# Patient Record
Sex: MALE | Race: WHITE | NOT HISPANIC OR LATINO | Employment: UNEMPLOYED | URBAN - METROPOLITAN AREA
[De-identification: names, ages, dates, MRNs, and addresses within clinical notes are randomized per-mention and may not be internally consistent; named-entity substitution may affect disease eponyms.]

---

## 2023-07-27 LAB
ALANINE AMINOTRANSFERASE (SGPT) (U/L) IN SER/PLAS: 22 U/L (ref 10–52)
ALBUMIN (G/DL) IN SER/PLAS: 4.6 G/DL (ref 3.4–5)
ALKALINE PHOSPHATASE (U/L) IN SER/PLAS: 66 U/L (ref 33–120)
ANION GAP IN SER/PLAS: 15 MMOL/L (ref 10–20)
ASPARTATE AMINOTRANSFERASE (SGOT) (U/L) IN SER/PLAS: 15 U/L (ref 9–39)
BILIRUBIN TOTAL (MG/DL) IN SER/PLAS: 1.4 MG/DL (ref 0–1.2)
CALCIUM (MG/DL) IN SER/PLAS: 10.2 MG/DL (ref 8.6–10.6)
CARBON DIOXIDE, TOTAL (MMOL/L) IN SER/PLAS: 26 MMOL/L (ref 21–32)
CHLORIDE (MMOL/L) IN SER/PLAS: 101 MMOL/L (ref 98–107)
CHOLESTEROL (MG/DL) IN SER/PLAS: 110 MG/DL (ref 0–199)
CHOLESTEROL IN HDL (MG/DL) IN SER/PLAS: 38.8 MG/DL
CHOLESTEROL/HDL RATIO: 2.8
CREATININE (MG/DL) IN SER/PLAS: 0.87 MG/DL (ref 0.5–1.3)
ESTIMATED AVERAGE GLUCOSE FOR HBA1C: 143 MG/DL
GFR MALE: >90 ML/MIN/1.73M2
GLUCOSE (MG/DL) IN SER/PLAS: 101 MG/DL (ref 74–99)
HEMOGLOBIN A1C/HEMOGLOBIN TOTAL IN BLOOD: 6.6 %
LDL: 35 MG/DL (ref 0–99)
POTASSIUM (MMOL/L) IN SER/PLAS: 4.3 MMOL/L (ref 3.5–5.3)
PROSTATE SPECIFIC ANTIGEN,SCREEN: 0.63 NG/ML (ref 0–4)
PROTEIN TOTAL: 7.6 G/DL (ref 6.4–8.2)
SODIUM (MMOL/L) IN SER/PLAS: 138 MMOL/L (ref 136–145)
THYROTROPIN (MIU/L) IN SER/PLAS BY DETECTION LIMIT <= 0.05 MIU/L: 2.32 MIU/L (ref 0.44–3.98)
TRIGLYCERIDE (MG/DL) IN SER/PLAS: 183 MG/DL (ref 0–149)
UREA NITROGEN (MG/DL) IN SER/PLAS: 11 MG/DL (ref 6–23)
VLDL: 37 MG/DL (ref 0–40)

## 2023-10-03 DIAGNOSIS — I10 PRIMARY HYPERTENSION: Primary | ICD-10-CM

## 2023-10-03 RX ORDER — METOPROLOL SUCCINATE 100 MG/1
100 TABLET, EXTENDED RELEASE ORAL DAILY
Qty: 90 TABLET | Refills: 1 | Status: SHIPPED | OUTPATIENT
Start: 2023-10-03 | End: 2024-04-08

## 2023-12-27 DIAGNOSIS — R05.9 COUGH, UNSPECIFIED: ICD-10-CM

## 2023-12-27 DIAGNOSIS — E11.9 TYPE 2 DIABETES MELLITUS WITHOUT COMPLICATION, WITHOUT LONG-TERM CURRENT USE OF INSULIN (MULTI): Primary | ICD-10-CM

## 2023-12-28 RX ORDER — ALBUTEROL SULFATE 90 UG/1
AEROSOL, METERED RESPIRATORY (INHALATION)
Qty: 18 G | Refills: 6 | Status: SHIPPED | OUTPATIENT
Start: 2023-12-28

## 2023-12-28 RX ORDER — DULAGLUTIDE 1.5 MG/.5ML
INJECTION, SOLUTION SUBCUTANEOUS
Qty: 1 EACH | Refills: 1 | Status: SHIPPED | OUTPATIENT
Start: 2023-12-28 | End: 2024-02-05 | Stop reason: SDUPTHER

## 2024-02-05 DIAGNOSIS — E11.9 TYPE 2 DIABETES MELLITUS WITHOUT COMPLICATION, WITHOUT LONG-TERM CURRENT USE OF INSULIN (MULTI): ICD-10-CM

## 2024-02-05 RX ORDER — DULAGLUTIDE 1.5 MG/.5ML
1.5 INJECTION, SOLUTION SUBCUTANEOUS
Qty: 1 ML | Refills: 1 | Status: SHIPPED | OUTPATIENT
Start: 2024-02-05 | End: 2024-04-23 | Stop reason: SDUPTHER

## 2024-02-16 DIAGNOSIS — E11.9 DIABETES MELLITUS, STABLE (MULTI): ICD-10-CM

## 2024-02-17 RX ORDER — GLIMEPIRIDE 4 MG/1
4 TABLET ORAL 2 TIMES DAILY
Qty: 60 TABLET | Refills: 0 | Status: SHIPPED | OUTPATIENT
Start: 2024-02-17 | End: 2024-03-15

## 2024-02-28 ENCOUNTER — OFFICE VISIT (OUTPATIENT)
Dept: PRIMARY CARE | Facility: CLINIC | Age: 54
End: 2024-02-28
Payer: COMMERCIAL

## 2024-02-28 ENCOUNTER — LAB (OUTPATIENT)
Dept: LAB | Facility: LAB | Age: 54
End: 2024-02-28
Payer: COMMERCIAL

## 2024-02-28 VITALS
HEIGHT: 70 IN | OXYGEN SATURATION: 97 % | WEIGHT: 306.44 LBS | TEMPERATURE: 98.6 F | BODY MASS INDEX: 43.87 KG/M2 | DIASTOLIC BLOOD PRESSURE: 78 MMHG | RESPIRATION RATE: 18 BRPM | HEART RATE: 91 BPM | SYSTOLIC BLOOD PRESSURE: 140 MMHG

## 2024-02-28 DIAGNOSIS — E78.2 MIXED HYPERLIPIDEMIA: ICD-10-CM

## 2024-02-28 DIAGNOSIS — R91.1 LUNG NODULE: ICD-10-CM

## 2024-02-28 DIAGNOSIS — E11.9 TYPE 2 DIABETES MELLITUS WITHOUT COMPLICATION, WITHOUT LONG-TERM CURRENT USE OF INSULIN (MULTI): ICD-10-CM

## 2024-02-28 DIAGNOSIS — M10.9 ACUTE GOUT OF LEFT FOOT, UNSPECIFIED CAUSE: ICD-10-CM

## 2024-02-28 DIAGNOSIS — B35.1 TOENAIL FUNGUS: ICD-10-CM

## 2024-02-28 DIAGNOSIS — Z11.59 NEED FOR HEPATITIS C SCREENING TEST: ICD-10-CM

## 2024-02-28 DIAGNOSIS — I10 BENIGN ESSENTIAL HYPERTENSION: Primary | ICD-10-CM

## 2024-02-28 DIAGNOSIS — M76.60 ACHILLES TENDON PAIN: ICD-10-CM

## 2024-02-28 PROBLEM — I82.409 DVT (DEEP VENOUS THROMBOSIS) (MULTI): Status: RESOLVED | Noted: 2024-02-28 | Resolved: 2024-02-28

## 2024-02-28 PROBLEM — R43.0 LOSS OF SMELL: Status: RESOLVED | Noted: 2024-02-28 | Resolved: 2024-02-28

## 2024-02-28 PROBLEM — L97.511 DIABETIC ULCER OF TOE OF RIGHT FOOT ASSOCIATED WITH TYPE 2 DIABETES MELLITUS, LIMITED TO BREAKDOWN OF SKIN (MULTI): Status: RESOLVED | Noted: 2021-08-15 | Resolved: 2024-02-28

## 2024-02-28 PROBLEM — R19.00 MASS OF ABDOMEN: Status: RESOLVED | Noted: 2024-02-28 | Resolved: 2024-02-28

## 2024-02-28 PROBLEM — J02.9 SORE THROAT: Status: RESOLVED | Noted: 2024-02-28 | Resolved: 2024-02-28

## 2024-02-28 PROBLEM — G89.18 ACUTE POST-OPERATIVE PAIN: Status: RESOLVED | Noted: 2024-02-28 | Resolved: 2024-02-28

## 2024-02-28 PROBLEM — R05.9 COUGH: Status: RESOLVED | Noted: 2024-02-28 | Resolved: 2024-02-28

## 2024-02-28 PROBLEM — D17.30 LIPOMA OF SKIN AND SUBCUTANEOUS TISSUE: Status: RESOLVED | Noted: 2024-02-28 | Resolved: 2024-02-28

## 2024-02-28 PROBLEM — R09.89 THROAT FULLNESS: Status: RESOLVED | Noted: 2024-02-28 | Resolved: 2024-02-28

## 2024-02-28 PROBLEM — R59.9 ENLARGED LYMPH NODES: Status: RESOLVED | Noted: 2024-02-28 | Resolved: 2024-02-28

## 2024-02-28 PROBLEM — D49.2 NEOPLASM OF SOFT TISSUE: Status: ACTIVE | Noted: 2024-02-28

## 2024-02-28 PROBLEM — M25.672 STIFFNESS OF LEFT ANKLE JOINT: Status: RESOLVED | Noted: 2024-02-28 | Resolved: 2024-02-28

## 2024-02-28 PROBLEM — R09.81 NASAL CONGESTION: Status: RESOLVED | Noted: 2024-02-28 | Resolved: 2024-02-28

## 2024-02-28 PROBLEM — F17.210 NICOTINE DEPENDENCE, CIGARETTES, UNCOMPLICATED: Status: RESOLVED | Noted: 2024-02-28 | Resolved: 2024-02-28

## 2024-02-28 PROBLEM — R93.1 AGATSTON CAC SCORE, <100: Status: RESOLVED | Noted: 2024-02-28 | Resolved: 2024-02-28

## 2024-02-28 PROBLEM — E11.621 DIABETIC ULCER OF TOE OF RIGHT FOOT ASSOCIATED WITH TYPE 2 DIABETES MELLITUS, LIMITED TO BREAKDOWN OF SKIN (MULTI): Status: RESOLVED | Noted: 2021-08-15 | Resolved: 2024-02-28

## 2024-02-28 PROBLEM — G47.33 OBSTRUCTIVE SLEEP APNEA SYNDROME: Status: ACTIVE | Noted: 2021-04-22

## 2024-02-28 PROBLEM — L97.509 DIABETIC TOE ULCER (MULTI): Status: RESOLVED | Noted: 2021-08-15 | Resolved: 2024-02-28

## 2024-02-28 PROBLEM — E66.01 SEVERE OBESITY (BMI 35.0-39.9) WITH COMORBIDITY (MULTI): Status: ACTIVE | Noted: 2024-02-28

## 2024-02-28 PROBLEM — M54.50 ACUTE RIGHT-SIDED LOW BACK PAIN WITHOUT SCIATICA: Status: RESOLVED | Noted: 2024-02-28 | Resolved: 2024-02-28

## 2024-02-28 PROBLEM — S82.209A FRACTURE, TIBIA: Status: RESOLVED | Noted: 2024-02-28 | Resolved: 2024-02-28

## 2024-02-28 PROBLEM — J20.8 ACUTE BACTERIAL BRONCHITIS: Status: RESOLVED | Noted: 2024-02-28 | Resolved: 2024-02-28

## 2024-02-28 PROBLEM — R13.10 FOOD STICKS ON SWALLOWING: Status: RESOLVED | Noted: 2024-02-28 | Resolved: 2024-02-28

## 2024-02-28 PROBLEM — F41.9 ANXIETY: Status: ACTIVE | Noted: 2024-02-28

## 2024-02-28 PROBLEM — J37.0 CHRONIC LARYNGITIS: Status: RESOLVED | Noted: 2021-04-22 | Resolved: 2024-02-28

## 2024-02-28 PROBLEM — B96.89 ACUTE BACTERIAL BRONCHITIS: Status: RESOLVED | Noted: 2024-02-28 | Resolved: 2024-02-28

## 2024-02-28 PROBLEM — M25.572 LEFT ANKLE PAIN: Status: RESOLVED | Noted: 2024-02-28 | Resolved: 2024-02-28

## 2024-02-28 PROBLEM — R01.1 MURMUR: Status: ACTIVE | Noted: 2024-02-28

## 2024-02-28 PROBLEM — L73.9 FOLLICULITIS: Status: RESOLVED | Noted: 2024-02-28 | Resolved: 2024-02-28

## 2024-02-28 PROBLEM — L30.9 ECZEMA OF HAND: Status: RESOLVED | Noted: 2024-02-28 | Resolved: 2024-02-28

## 2024-02-28 PROBLEM — D37.09 NEOPLASM OF UNCERTAIN BEHAVIOR OF UVULA: Status: ACTIVE | Noted: 2021-04-22

## 2024-02-28 PROBLEM — K21.9 GASTROESOPHAGEAL REFLUX DISEASE WITHOUT ESOPHAGITIS: Status: ACTIVE | Noted: 2021-04-22

## 2024-02-28 PROBLEM — R43.2 LOSS OF TASTE: Status: RESOLVED | Noted: 2024-02-28 | Resolved: 2024-02-28

## 2024-02-28 PROBLEM — E11.621 DIABETIC TOE ULCER (MULTI): Status: RESOLVED | Noted: 2021-08-15 | Resolved: 2024-02-28

## 2024-02-28 PROBLEM — M25.562 LEFT KNEE PAIN: Status: RESOLVED | Noted: 2024-02-28 | Resolved: 2024-02-28

## 2024-02-28 PROBLEM — F17.200 NICOTINE USE DISORDER: Status: RESOLVED | Noted: 2021-08-17 | Resolved: 2024-02-28

## 2024-02-28 LAB
ALBUMIN SERPL BCP-MCNC: 4.7 G/DL (ref 3.4–5)
ALP SERPL-CCNC: 60 U/L (ref 33–120)
ALT SERPL W P-5'-P-CCNC: 25 U/L (ref 10–52)
ANION GAP SERPL CALC-SCNC: 19 MMOL/L (ref 10–20)
AST SERPL W P-5'-P-CCNC: 19 U/L (ref 9–39)
BILIRUB SERPL-MCNC: 1.3 MG/DL (ref 0–1.2)
BUN SERPL-MCNC: 11 MG/DL (ref 6–23)
CALCIUM SERPL-MCNC: 9.8 MG/DL (ref 8.6–10.6)
CHLORIDE SERPL-SCNC: 99 MMOL/L (ref 98–107)
CO2 SERPL-SCNC: 24 MMOL/L (ref 21–32)
CREAT SERPL-MCNC: 0.87 MG/DL (ref 0.5–1.3)
CREAT UR-MCNC: 235.2 MG/DL (ref 20–370)
EGFRCR SERPLBLD CKD-EPI 2021: >90 ML/MIN/1.73M*2
ERYTHROCYTE [DISTWIDTH] IN BLOOD BY AUTOMATED COUNT: 12.7 % (ref 11.5–14.5)
EST. AVERAGE GLUCOSE BLD GHB EST-MCNC: 189 MG/DL
GLUCOSE SERPL-MCNC: 142 MG/DL (ref 74–99)
HBA1C MFR BLD: 8.2 %
HCT VFR BLD AUTO: 41.9 % (ref 41–52)
HCV AB SER QL: NONREACTIVE
HGB BLD-MCNC: 14.3 G/DL (ref 13.5–17.5)
MCH RBC QN AUTO: 32.6 PG (ref 26–34)
MCHC RBC AUTO-ENTMCNC: 34.1 G/DL (ref 32–36)
MCV RBC AUTO: 95 FL (ref 80–100)
MICROALBUMIN UR-MCNC: 83.7 MG/L
MICROALBUMIN/CREAT UR: 35.6 UG/MG CREAT
NRBC BLD-RTO: 0 /100 WBCS (ref 0–0)
PLATELET # BLD AUTO: 238 X10*3/UL (ref 150–450)
POTASSIUM SERPL-SCNC: 4.1 MMOL/L (ref 3.5–5.3)
PROT SERPL-MCNC: 7.3 G/DL (ref 6.4–8.2)
RBC # BLD AUTO: 4.39 X10*6/UL (ref 4.5–5.9)
SODIUM SERPL-SCNC: 138 MMOL/L (ref 136–145)
URATE SERPL-MCNC: 8.4 MG/DL (ref 4–7.5)
WBC # BLD AUTO: 7.5 X10*3/UL (ref 4.4–11.3)

## 2024-02-28 PROCEDURE — 83036 HEMOGLOBIN GLYCOSYLATED A1C: CPT

## 2024-02-28 PROCEDURE — 84550 ASSAY OF BLOOD/URIC ACID: CPT

## 2024-02-28 PROCEDURE — 86803 HEPATITIS C AB TEST: CPT

## 2024-02-28 PROCEDURE — 3078F DIAST BP <80 MM HG: CPT | Performed by: PHYSICIAN ASSISTANT

## 2024-02-28 PROCEDURE — 82043 UR ALBUMIN QUANTITATIVE: CPT

## 2024-02-28 PROCEDURE — 80053 COMPREHEN METABOLIC PANEL: CPT

## 2024-02-28 PROCEDURE — 36415 COLL VENOUS BLD VENIPUNCTURE: CPT

## 2024-02-28 PROCEDURE — 85027 COMPLETE CBC AUTOMATED: CPT

## 2024-02-28 PROCEDURE — 82570 ASSAY OF URINE CREATININE: CPT

## 2024-02-28 PROCEDURE — 1036F TOBACCO NON-USER: CPT | Performed by: PHYSICIAN ASSISTANT

## 2024-02-28 PROCEDURE — 3077F SYST BP >= 140 MM HG: CPT | Performed by: PHYSICIAN ASSISTANT

## 2024-02-28 PROCEDURE — 99215 OFFICE O/P EST HI 40 MIN: CPT | Performed by: PHYSICIAN ASSISTANT

## 2024-02-28 RX ORDER — METFORMIN HYDROCHLORIDE 1000 MG/1
1 TABLET ORAL
COMMUNITY
Start: 2019-10-15

## 2024-02-28 RX ORDER — BLOOD SUGAR DIAGNOSTIC
STRIP MISCELLANEOUS
COMMUNITY

## 2024-02-28 RX ORDER — ATORVASTATIN CALCIUM 40 MG/1
40 TABLET, FILM COATED ORAL DAILY
Qty: 90 TABLET | Refills: 1 | Status: SHIPPED | OUTPATIENT
Start: 2024-02-28 | End: 2024-08-26

## 2024-02-28 RX ORDER — ATORVASTATIN CALCIUM 40 MG/1
1 TABLET, FILM COATED ORAL DAILY
COMMUNITY
Start: 2019-11-11 | End: 2024-02-28 | Stop reason: SDUPTHER

## 2024-02-28 ASSESSMENT — ENCOUNTER SYMPTOMS
ARTHRALGIAS: 0
MUSCULOSKELETAL NEGATIVE: 1
ABDOMINAL PAIN: 0
WHEEZING: 0
HEADACHES: 0
OCCASIONAL FEELINGS OF UNSTEADINESS: 0
VOMITING: 0
SHORTNESS OF BREATH: 0
HEMATOLOGIC/LYMPHATIC NEGATIVE: 1
PALPITATIONS: 0
DIZZINESS: 0
ALLERGIC/IMMUNOLOGIC NEGATIVE: 1
NAUSEA: 0
LOSS OF SENSATION IN FEET: 0
RESPIRATORY NEGATIVE: 1
NERVOUS/ANXIOUS: 0
ENDOCRINE NEGATIVE: 1
NEUROLOGICAL NEGATIVE: 1
PSYCHIATRIC NEGATIVE: 1
EYES NEGATIVE: 1
CONSTITUTIONAL NEGATIVE: 1
DEPRESSION: 0
COUGH: 0
BACK PAIN: 0

## 2024-02-28 ASSESSMENT — PATIENT HEALTH QUESTIONNAIRE - PHQ9
SUM OF ALL RESPONSES TO PHQ9 QUESTIONS 1 AND 2: 0
1. LITTLE INTEREST OR PLEASURE IN DOING THINGS: NOT AT ALL
2. FEELING DOWN, DEPRESSED OR HOPELESS: NOT AT ALL

## 2024-02-28 ASSESSMENT — PAIN SCALES - GENERAL: PAINLEVEL: 3

## 2024-02-28 NOTE — PROGRESS NOTES
"Subjective   Patient ID: Jack Nolen is a 54 y.o. male who presents for Med Refill.    Med Refill  Pertinent negatives include no abdominal pain, arthralgias, chest pain, coughing, headaches, nausea, rash or vomiting.      JACK NOLEN, 54 year old male is here today for follow up     immunizations declined shingrex - advised of risks- pt aware      controlled diabetes - due for A1c and eye exam   BS at home in am 300s - lunch 250s and pm ranges 200-230s        HTN- taking medications - controlled no Ses- no CP no SOB    Trace edema lower extremities        QUIT tobacco dependency 1PPD x 30 years,- 9 months ago    + ETOH 1-2 x week social      not taking BS at home - wt gain due to quit smoking 9 months ago     patient states feeling well otherwise  denies fever, chills, N/V, headache, dizziness, CP, SOB, palpitations,, weakness  A chaperone was offered to the patient for the physical exam /  exam and was declined       Pt declined all vaccines today - shingles and pneumonia  covid  flu     Review of Systems   Constitutional: Negative.    HENT: Negative.     Eyes: Negative.    Respiratory: Negative.  Negative for cough, shortness of breath and wheezing.    Cardiovascular:  Positive for leg swelling. Negative for chest pain and palpitations.   Gastrointestinal:  Negative for abdominal pain, nausea and vomiting.   Endocrine: Negative.    Genitourinary: Negative.    Musculoskeletal: Negative.  Negative for arthralgias and back pain.        Foot pain - achilles region right    Skin: Negative.  Negative for rash.   Allergic/Immunologic: Negative.    Neurological: Negative.  Negative for dizziness and headaches.   Hematological: Negative.    Psychiatric/Behavioral: Negative.  The patient is not nervous/anxious.        Objective   /78 (BP Location: Left arm, Patient Position: Sitting)   Pulse 91   Temp 37 °C (98.6 °F) (Temporal)   Resp 18   Ht 1.775 m (5' 9.88\")   Wt 139 kg (306 lb 7 oz)   " SpO2 97%   BMI 44.12 kg/m²     Physical Exam  Constitutional:       Appearance: Normal appearance. He is obese.   HENT:      Head: Normocephalic and atraumatic.   Cardiovascular:      Rate and Rhythm: Normal rate and regular rhythm.      Heart sounds: Normal heart sounds.   Pulmonary:      Effort: Pulmonary effort is normal.      Breath sounds: Normal breath sounds.   Abdominal:      General: Bowel sounds are normal.      Palpations: Abdomen is soft.   Musculoskeletal:         General: Normal range of motion.      Cervical back: Neck supple.   Skin:     General: Skin is warm and dry.   Neurological:      General: No focal deficit present.      Mental Status: He is alert and oriented to person, place, and time.      Comments: Feet:  socks removed, no foot pain reported, no deformities, ulcers,   + calluses and   + pain in right achilles   sensitive to 10 GM monofilament=- only at some poins  + numbness at others      Psychiatric:         Mood and Affect: Mood normal.         Behavior: Behavior normal.         Thought Content: Thought content normal.         Judgment: Judgment normal.         Assessment/Plan   Problem List Items Addressed This Visit       Acute gout of left foot    Relevant Orders    Uric Acid    Benign essential hypertension - Primary    Type 2 diabetes mellitus without complication (CMS/HCC)    Relevant Orders    Albumin , Urine Random    Hemoglobin A1C    Referral to Ophthalmology    Mixed hyperlipidemia    Relevant Medications    atorvastatin (Lipitor) 40 mg tablet    Other Relevant Orders    Comprehensive Metabolic Panel     Other Visit Diagnoses       Need for hepatitis C screening test        Relevant Orders    Hepatitis C Antibody             Diagnoses/Problems       Type 2 diabetes mellitus, uncontrolled (250.02) (E11.65)    DM w/o complication type II (250.00) (E11.9)  -Stable, poor control, not taking BS at home - wt gain due to quit smoking 9 months ago   -Continue current medications,  side effects, risks and options discussed with patient   -Blood glucose monitoring daily advised  -Ophthalmology for evaluation and management of diabetic eye changes - DUE   -Encouraged regular aerobic exercise weight loss and diabetic diet ADA  -Discussed diabetic education issues of long-term diabetic complications, hyperglycemic symptoms, diet, and dictations-side effects and need for compliance, importance of exercise  -Follow up in 3-4 months  should any issues arise before then f/u sooner   monitor BS call if lower than 70 or above 180   Patient verbalized understanding and agrees to plan of care.          Benign essential hypertension (401.1) (I10)  -stable  -Fair control  -Continue current medications, side effects, risks and options discussed, patient aware   -Encouraged dietary sodium restriction/ DASH diet  -Recommend regular aerobic exercise  -Discussed need and benefit for weight loss  -Recheck in 3- 4 months or sooner should any symptoms or problems arise  - Goal of blood pressure less than 130/80          Mixed hyperlipidemia (272.2) (E78.2)  -Continue current medication as directed  -Encouraged following a low-fat low-cholesterol diet   -Discussed the benefits of regular aerobic exercise and weight loss  -Encouraged following a low-carb healthy oil intake diet  - last lipid 7/2023          Morbid obesity with BMI of 40.0-44.9, adult (278.01,V85.41) (E66.01,Z68.41)  -Discussed with the patient significance of elevated BMI and obesity. Emphasized cardiovascular risk   pt to follow ADA diet and exercise plan      tobacco depend  -CT low does chest - due   - quit 9 months ago   -Cessation encouraged, You need to stop smoking. Though it is not easy, more than half of all adult smokers  have quit. We encourage you to write down all the reasons you should quit smoking and  set a quit date for yourself. Ask us how we can help. You may also call  6-938-QUIT-NOW for free resources and  assistance  --physiological and physical aspects of tobacco addiction as well as strategies for quitting were discussed  -Counseling was given focusing on the harmful effects of this addiction especially given the patient's medical conditions which will be worsened because of that, goals and tobacco        High complexity visit of 45 minutes face-to-face with at least half of the time discussing  Results of my examination, clinical findings, impression and diagnoses discussed with the patient as well as results of the latest blood work. The patient was in agreement with the plan and verbalized a good understanding of instructions and plans for treatment. At the end of the visit today, the patient felt that all questions had been answered satisfactorily. The patient was pleased with the visit and very appreciative for the care rendered.        Pt declined all vaccines today - shingles and pneumonia  covid  flu

## 2024-03-04 ENCOUNTER — TELEPHONE (OUTPATIENT)
Dept: PRIMARY CARE | Facility: CLINIC | Age: 54
End: 2024-03-04

## 2024-03-04 ENCOUNTER — TELEPHONE (OUTPATIENT)
Dept: PRIMARY CARE | Facility: CLINIC | Age: 54
End: 2024-03-04
Payer: COMMERCIAL

## 2024-03-04 DIAGNOSIS — E11.9 TYPE 2 DIABETES MELLITUS WITHOUT COMPLICATION, WITHOUT LONG-TERM CURRENT USE OF INSULIN (MULTI): ICD-10-CM

## 2024-03-05 NOTE — RESULT ENCOUNTER NOTE
Spoke  to the pt - increased wt and eating poorly due to just quit smoking - pt also unable to get Trulicity due to back order will try ozempicc 0.25 weekly for 4-8 weeks and follow up  in office if any side effects  Also elevated uric acid - pt states no longer having the foot and heel pain   All other  labs are stable -continue medications as directed and also continue low fat sugar diet exercise multivitamin and vitamin D daily   Make sure you are watching and/or cutting back on carbs such as bread, pasta, rice, potato, butters and fats, fried foods, red meats while working on a low carb/low fat diet. Cut back on sweets and sugar containing products. NO FAST FOOD. Water should be the main beverage. Cut back on alcohol and/or tobacco if you are using. Review the Mediterranean diet online. Also incorporate 30-50 consecutive minutes of physical activity/exercise daily. We will continue to monitor your cholesterol.  Call office with any further questions or concerns     The patient was in agreement with the plan and verbalized a good understanding of instructions and plans for treatment.

## 2024-03-07 ENCOUNTER — OFFICE VISIT (OUTPATIENT)
Dept: PRIMARY CARE | Facility: CLINIC | Age: 54
End: 2024-03-07
Payer: COMMERCIAL

## 2024-03-07 DIAGNOSIS — I10 BENIGN ESSENTIAL HYPERTENSION: ICD-10-CM

## 2024-03-07 DIAGNOSIS — E78.2 MIXED HYPERLIPIDEMIA: ICD-10-CM

## 2024-03-07 DIAGNOSIS — E66.01 SEVERE OBESITY (BMI 35.0-39.9) WITH COMORBIDITY (MULTI): ICD-10-CM

## 2024-03-07 DIAGNOSIS — Z87.891 HISTORY OF TOBACCO USE: ICD-10-CM

## 2024-03-07 DIAGNOSIS — E11.9 TYPE 2 DIABETES MELLITUS WITHOUT COMPLICATION, WITHOUT LONG-TERM CURRENT USE OF INSULIN (MULTI): Primary | ICD-10-CM

## 2024-03-07 PROCEDURE — 1036F TOBACCO NON-USER: CPT | Performed by: PHYSICIAN ASSISTANT

## 2024-03-07 PROCEDURE — 99214 OFFICE O/P EST MOD 30 MIN: CPT | Performed by: PHYSICIAN ASSISTANT

## 2024-03-07 PROCEDURE — 3052F HG A1C>EQUAL 8.0%<EQUAL 9.0%: CPT | Performed by: PHYSICIAN ASSISTANT

## 2024-03-07 PROCEDURE — 3060F POS MICROALBUMINURIA REV: CPT | Performed by: PHYSICIAN ASSISTANT

## 2024-03-07 ASSESSMENT — ENCOUNTER SYMPTOMS
MUSCULOSKELETAL NEGATIVE: 1
HEADACHES: 0
NAUSEA: 0
COUGH: 0
SHORTNESS OF BREATH: 0
CONSTITUTIONAL NEGATIVE: 1
EYES NEGATIVE: 1
ARTHRALGIAS: 0
BACK PAIN: 0
WHEEZING: 0
PSYCHIATRIC NEGATIVE: 1
VOMITING: 0
NEUROLOGICAL NEGATIVE: 1
RESPIRATORY NEGATIVE: 1
HEMATOLOGIC/LYMPHATIC NEGATIVE: 1
NERVOUS/ANXIOUS: 0
ENDOCRINE NEGATIVE: 1
PALPITATIONS: 0
DIZZINESS: 0
ALLERGIC/IMMUNOLOGIC NEGATIVE: 1
ABDOMINAL PAIN: 0

## 2024-03-07 NOTE — PROGRESS NOTES
Subjective   Patient ID: Jack Nolen is a 54 y.o. male who presents for No chief complaint on file..    HPI   JACK NOLEN, 54 year old male is here today for follow up      immunizations declined shingrex - advised of risks- pt aware      controlled diabetes - due for A1c and eye exam   BS at home in am 300s - lunch 250s and pm ranges 200-230s         HTN- taking medications - controlled no Ses- no CP no SOB    Trace edema lower extremities         QUIT tobacco dependency 1PPD x 30 years,- 9 months ago    + ETOH 1-2 x week social      not taking BS at home - wt gain due to quit smoking 9 months ago      Review of Systems   Constitutional: Negative.    HENT: Negative.     Eyes: Negative.    Respiratory: Negative.  Negative for cough, shortness of breath and wheezing.    Cardiovascular:  Negative for chest pain and palpitations.   Gastrointestinal:  Negative for abdominal pain, nausea and vomiting.   Endocrine: Negative.    Genitourinary: Negative.    Musculoskeletal: Negative.  Negative for arthralgias and back pain.   Skin: Negative.  Negative for rash.   Allergic/Immunologic: Negative.    Neurological: Negative.  Negative for dizziness and headaches.   Hematological: Negative.    Psychiatric/Behavioral: Negative.  The patient is not nervous/anxious.        Objective   There were no vitals taken for this visit.    Physical Exam  Vitals and nursing note reviewed.   Constitutional:       Appearance: Normal appearance. He is obese.   HENT:      Head: Atraumatic.   Cardiovascular:      Rate and Rhythm: Normal rate and regular rhythm.      Heart sounds: Normal heart sounds.   Pulmonary:      Effort: Pulmonary effort is normal.      Breath sounds: Normal breath sounds.   Musculoskeletal:      Cervical back: Neck supple.   Skin:     General: Skin is warm and dry.   Neurological:      Mental Status: He is alert and oriented to person, place, and time.   Psychiatric:         Mood and Affect: Mood normal.          Behavior: Behavior normal.         Thought Content: Thought content normal.         Judgment: Judgment normal.         Assessment/Plan   Problem List Items Addressed This Visit       Benign essential hypertension    Type 2 diabetes mellitus without complication (CMS/HCC) - Primary    Severe obesity (BMI 35.0-39.9) with comorbidity (CMS/HCC)    Mixed hyperlipidemia     Other Visit Diagnoses       History of tobacco use               Type 2 diabetes mellitus, uncontrolled (250.02) (E11.65)    DM w/o complication type II (250.00) (E11.9)  -Stable, poor control, not taking BS at home - wt gain due to quit smoking 9 months ago - last A1c 8.2- up from 6.6 - also can not get trulicy - due to backorder  -will switch to ozempic 0.25mg once weekly x 4 weeks and follow up in 4 weeks may increase to 0.50 once weekly if tolerating well and no Ses   -Continue current medications, side effects, risks and options discussed with patient - sample given today and instructed on use as well   -Blood glucose monitoring daily advised  -Ophthalmology for evaluation and management of diabetic eye changes - DUE   -Encouraged regular aerobic exercise weight loss and diabetic diet ADA  -Discussed diabetic education issues of long-term diabetic complications, hyperglycemic symptoms, diet, and dictations-side effects and need for compliance, importance of exercise  -Follow up in 3-4 months  should any issues arise before then f/u sooner   monitor BS call if lower than 70 or above 180   Patient verbalized understanding and agrees to plan of care.          Benign essential hypertension (401.1) (I10)  -stable  -Fair control  -Continue current medications, side effects, risks and options discussed, patient aware   -Encouraged dietary sodium restriction/ DASH diet  -Recommend regular aerobic exercise  -Discussed need and benefit for weight loss  -Recheck in 3- 4 months or sooner should any symptoms or problems arise  - Goal of blood pressure  less than 130/80          Mixed hyperlipidemia (272.2) (E78.2)  -Continue current medication as directed  -Encouraged following a low-fat low-cholesterol diet   -Discussed the benefits of regular aerobic exercise and weight loss  -Encouraged following a low-carb healthy oil intake diet  - last lipid 7/2023           Morbid obesity with BMI of 40.0-44.9, adult (278.01,V85.41) (E66.01,Z68.41)  -Discussed with the patient significance of elevated BMI and obesity. Emphasized cardiovascular risk   pt to follow ADA diet and exercise plan      tobacco depend  -CT low does chest - due   - quit 9 months ago   -Cessation encouraged, You need to stop smoking. Though it is not easy, more than half of all adult smokers  have quit. We encourage you to write down all the reasons you should quit smoking and  set a quit date for yourself. Ask us how we can help. You may also call  9-800-QUIT-NOW for free resources and assistance  --physiological and physical aspects of tobacco addiction as well as strategies for quitting were discussed  -Counseling was given focusing on the harmful effects of this addiction especially given the patient's medical conditions which will be worsened because of that, goals and tobacco        High complexity visit of 30 minutes face-to-face with at least half of the time discussing  Results of my examination, clinical findings, impression and diagnoses discussed with the patient as well as results of the latest blood work. The patient was in agreement with the plan and verbalized a good understanding of instructions and plans for treatment. At the end of the visit today, the patient felt that all questions had been answered satisfactorily. The patient was pleased with the visit and very appreciative for the care rendered.

## 2024-03-15 DIAGNOSIS — E11.9 DIABETES MELLITUS, STABLE (MULTI): ICD-10-CM

## 2024-03-15 RX ORDER — GLIMEPIRIDE 4 MG/1
4 TABLET ORAL 2 TIMES DAILY
Qty: 180 TABLET | Refills: 1 | Status: SHIPPED | OUTPATIENT
Start: 2024-03-15

## 2024-04-08 DIAGNOSIS — I10 PRIMARY HYPERTENSION: ICD-10-CM

## 2024-04-08 RX ORDER — METOPROLOL SUCCINATE 100 MG/1
100 TABLET, EXTENDED RELEASE ORAL DAILY
Qty: 90 TABLET | Refills: 1 | Status: SHIPPED | OUTPATIENT
Start: 2024-04-08

## 2024-04-23 ENCOUNTER — OFFICE VISIT (OUTPATIENT)
Dept: PRIMARY CARE | Facility: CLINIC | Age: 54
End: 2024-04-23
Payer: COMMERCIAL

## 2024-04-23 VITALS
SYSTOLIC BLOOD PRESSURE: 144 MMHG | DIASTOLIC BLOOD PRESSURE: 74 MMHG | BODY MASS INDEX: 45.03 KG/M2 | HEIGHT: 69 IN | WEIGHT: 304 LBS | HEART RATE: 93 BPM | OXYGEN SATURATION: 95 %

## 2024-04-23 DIAGNOSIS — L02.224 BOIL, GROIN: ICD-10-CM

## 2024-04-23 DIAGNOSIS — E66.01 MORBID OBESITY (MULTI): ICD-10-CM

## 2024-04-23 DIAGNOSIS — E11.9 TYPE 2 DIABETES MELLITUS WITHOUT COMPLICATION, WITHOUT LONG-TERM CURRENT USE OF INSULIN (MULTI): ICD-10-CM

## 2024-04-23 DIAGNOSIS — G47.33 OBSTRUCTIVE SLEEP APNEA SYNDROME: ICD-10-CM

## 2024-04-23 DIAGNOSIS — I10 BENIGN ESSENTIAL HYPERTENSION: Primary | ICD-10-CM

## 2024-04-23 PROCEDURE — 3060F POS MICROALBUMINURIA REV: CPT | Performed by: PHYSICIAN ASSISTANT

## 2024-04-23 PROCEDURE — 3052F HG A1C>EQUAL 8.0%<EQUAL 9.0%: CPT | Performed by: PHYSICIAN ASSISTANT

## 2024-04-23 PROCEDURE — 3077F SYST BP >= 140 MM HG: CPT | Performed by: PHYSICIAN ASSISTANT

## 2024-04-23 PROCEDURE — 99214 OFFICE O/P EST MOD 30 MIN: CPT | Performed by: PHYSICIAN ASSISTANT

## 2024-04-23 PROCEDURE — 1036F TOBACCO NON-USER: CPT | Performed by: PHYSICIAN ASSISTANT

## 2024-04-23 PROCEDURE — 3078F DIAST BP <80 MM HG: CPT | Performed by: PHYSICIAN ASSISTANT

## 2024-04-23 PROCEDURE — 3008F BODY MASS INDEX DOCD: CPT | Performed by: PHYSICIAN ASSISTANT

## 2024-04-23 RX ORDER — DULAGLUTIDE 1.5 MG/.5ML
3 INJECTION, SOLUTION SUBCUTANEOUS
Qty: 1 ML | Refills: 1 | Status: SHIPPED | OUTPATIENT
Start: 2024-04-28 | End: 2024-04-23

## 2024-04-23 RX ORDER — CEPHALEXIN 500 MG/1
500 CAPSULE ORAL 2 TIMES DAILY
Qty: 14 CAPSULE | Refills: 0 | Status: SHIPPED | OUTPATIENT
Start: 2024-04-23 | End: 2024-04-30

## 2024-04-23 RX ORDER — DULAGLUTIDE 1.5 MG/.5ML
3 INJECTION, SOLUTION SUBCUTANEOUS
Qty: 4 ML | Refills: 3 | Status: SHIPPED | OUTPATIENT
Start: 2024-04-23

## 2024-04-23 ASSESSMENT — ENCOUNTER SYMPTOMS
LOSS OF SENSATION IN FEET: 0
DEPRESSION: 0
OCCASIONAL FEELINGS OF UNSTEADINESS: 0

## 2024-04-23 ASSESSMENT — PATIENT HEALTH QUESTIONNAIRE - PHQ9
1. LITTLE INTEREST OR PLEASURE IN DOING THINGS: NOT AT ALL
2. FEELING DOWN, DEPRESSED OR HOPELESS: NOT AT ALL
SUM OF ALL RESPONSES TO PHQ9 QUESTIONS 1 AND 2: 0

## 2024-04-23 NOTE — PROGRESS NOTES
"Subjective   Patient ID: Jack Nolen is a 54 y.o. male who presents for Annual Exam (Diabetes -  possible boil on the inside of R thigh).    HPI   JACK NOLEN, 54 year old male is here today for follow up      immunizations declined shingrex - advised of risks- pt aware      uncontrolled diabetes - last A1c 8.2 and eye exam DUE -   BS at home in am 300s - lunch 250s and pm ranges 200-230s   Went to ozempic - trulicity not avaiable - doing well no Ses - but now not covered by insurance        HTN- taking medications - controlled no Ses- no CP no SOB    Trace edema lower extremities         QUIT tobacco dependency 1PPD x 30 years,- 9 months ago    + ETOH 1-2 x week social      not taking BS at home - wt gain due to quit smoking 9 months ago      Boil - right inner thigh - near groin - _+ draining - pt noticed 2 weeks ago - doing better- smaller       Review of Systems   Constitutional: Negative.    HENT: Negative.     Eyes: Negative.    Respiratory: Negative.  Negative for cough, shortness of breath and wheezing.    Cardiovascular: Negative.  Negative for chest pain and palpitations.   Gastrointestinal: Negative.  Negative for abdominal pain, nausea and vomiting.   Endocrine: Negative.    Genitourinary: Negative.    Musculoskeletal: Negative.  Negative for arthralgias and back pain.   Skin: Negative.  Negative for rash.        + boil - no warmth or redness- right inner thigh    Allergic/Immunologic: Negative.    Neurological: Negative.  Negative for dizziness and headaches.   Hematological: Negative.    Psychiatric/Behavioral: Negative.  The patient is not nervous/anxious.        Objective   /74 (BP Location: Right arm, Patient Position: Sitting)   Pulse 93   Ht 1.753 m (5' 9\")   Wt 138 kg (304 lb)   SpO2 95%   BMI 44.89 kg/m²     Physical Exam  Vitals and nursing note reviewed.   Constitutional:       Appearance: Normal appearance. He is obese.   HENT:      Head: Normocephalic and " atraumatic.   Cardiovascular:      Rate and Rhythm: Normal rate and regular rhythm.      Heart sounds: Normal heart sounds.   Pulmonary:      Effort: Pulmonary effort is normal.      Breath sounds: Normal breath sounds.   Abdominal:      General: Bowel sounds are normal.      Palpations: Abdomen is soft.   Musculoskeletal:         General: Normal range of motion.      Cervical back: Neck supple.   Skin:     General: Skin is warm.      Comments: + boil in groin inner thigh   Neurological:      General: No focal deficit present.      Mental Status: He is alert and oriented to person, place, and time.   Psychiatric:         Mood and Affect: Mood normal.         Behavior: Behavior normal.         Thought Content: Thought content normal.         Judgment: Judgment normal.       Assessment/Plan   Problem List Items Addressed This Visit       Benign essential hypertension - Primary    Type 2 diabetes mellitus without complication (Multi)    Relevant Medications    dulaglutide (Trulicity) 1.5 mg/0.5 mL pen injector injection (Start on 4/28/2024)    Obstructive sleep apnea syndrome     Other Visit Diagnoses       BMI 40.0-44.9, adult (Multi)        Morbid obesity (Multi)        Boil, groin        Relevant Medications    cephalexin (Keflex) 500 mg capsule            Boil in groin may need I and D  RX keflex - no reaction or SE when taken the last 2 years in hospital for foot infection   -Discussed medication dosage, usage, goals of therapy, and side effects      Type 2 diabetes mellitus, uncontrolled (250.02) (E11.65)   • DM w/o complication type II (250.00) (E11.9)  -Stable, poor control, not taking BS at home - wt gain due to quit smoking 9 months ago - last A1c 8.2- up from 6.6 - also can not get trulicy - due to backorder  -will switch to ozempic 0.25mg once weekly x 4 weeks and follow up in 4 weeks may increase to 0.50 once weekly if tolerating well and no Ses   -Continue current medications, side effects, risks and  options discussed with patient - sample given today and instructed on use as well   -Blood glucose monitoring daily advised  -Ophthalmology for evaluation and management of diabetic eye changes - DUE   -Encouraged regular aerobic exercise weight loss and diabetic diet ADA  -Discussed diabetic education issues of long-term diabetic complications, hyperglycemic symptoms, diet, and dictations-side effects and need for compliance, importance of exercise  -Follow up in 3-4 months  should any issues arise before then f/u sooner   monitor BS call if lower than 70 or above 180   Patient verbalized understanding and agrees to plan of care.         • Benign essential hypertension (401.1) (I10)  -stable  -Fair control  -Continue current medications, side effects, risks and options discussed, patient aware   -Encouraged dietary sodium restriction/ DASH diet  -Recommend regular aerobic exercise  -Discussed need and benefit for weight loss  -Recheck in 3- 4 months or sooner should any symptoms or problems arise  - Goal of blood pressure less than 130/80         • Mixed hyperlipidemia (272.2) (E78.2)  -Continue current medication as directed  -Encouraged following a low-fat low-cholesterol diet   -Discussed the benefits of regular aerobic exercise and weight loss  -Encouraged following a low-carb healthy oil intake diet  - last lipid 7/2023          • Morbid obesity with BMI of 40.0-44.9, adult (278.01,V85.41) (E66.01,Z68.41)  -Discussed with the patient significance of elevated BMI and obesity. Emphasized cardiovascular risk   pt to follow ADA diet and exercise plan      tobacco depend  -CT low does chest - due   - quit 11 months ago   -Cessation encouraged, You need to stop smoking. Though it is not easy, more than half of all adult smokers  have quit. We encourage you to write down all the reasons you should quit smoking and  set a quit date for yourself. Ask us how we can help. You may also call  7-537-QUIT-NOW for free  resources and assistance  --physiological and physical aspects of tobacco addiction as well as strategies for quitting were discussed  -Counseling was given focusing on the harmful effects of this addiction especially given the patient's medical conditions which will be worsened because of that, goals and tobacco             complexity visit of 30+  minutes face-to-face with at least half of the time discussing  Results of my examination, clinical findings, impression and diagnoses discussed with the patient as well as results of the latest test/lab work. The patient was in agreement with the plan and verbalized a good understanding of instructions and plans for treatment. At the end of the visit today, the patient felt that all questions had been answered satisfactorily. The patient was pleased with the visit and very appreciative for the care rendered.

## 2024-04-24 DIAGNOSIS — I10 ESSENTIAL (PRIMARY) HYPERTENSION: ICD-10-CM

## 2024-04-24 ASSESSMENT — ENCOUNTER SYMPTOMS
NAUSEA: 0
ARTHRALGIAS: 0
SHORTNESS OF BREATH: 0
WHEEZING: 0
PSYCHIATRIC NEGATIVE: 1
BACK PAIN: 0
CONSTITUTIONAL NEGATIVE: 1
HEMATOLOGIC/LYMPHATIC NEGATIVE: 1
VOMITING: 0
ALLERGIC/IMMUNOLOGIC NEGATIVE: 1
CARDIOVASCULAR NEGATIVE: 1
ABDOMINAL PAIN: 0
RESPIRATORY NEGATIVE: 1
GASTROINTESTINAL NEGATIVE: 1
NERVOUS/ANXIOUS: 0
PALPITATIONS: 0
EYES NEGATIVE: 1
HEADACHES: 0
ENDOCRINE NEGATIVE: 1
DIZZINESS: 0
NEUROLOGICAL NEGATIVE: 1
MUSCULOSKELETAL NEGATIVE: 1
COUGH: 0

## 2024-04-26 RX ORDER — VALSARTAN AND HYDROCHLOROTHIAZIDE 160; 12.5 MG/1; MG/1
1 TABLET, FILM COATED ORAL DAILY
Qty: 90 TABLET | Refills: 1 | Status: SHIPPED | OUTPATIENT
Start: 2024-04-26

## 2024-06-18 DIAGNOSIS — E11.9 TYPE 2 DIABETES MELLITUS WITHOUT COMPLICATION, WITHOUT LONG-TERM CURRENT USE OF INSULIN (MULTI): ICD-10-CM

## 2024-06-18 DIAGNOSIS — E78.2 MIXED HYPERLIPIDEMIA: ICD-10-CM

## 2024-06-18 RX ORDER — BLOOD SUGAR DIAGNOSTIC
STRIP MISCELLANEOUS
Qty: 300 STRIP | Refills: 3 | Status: SHIPPED | OUTPATIENT
Start: 2024-06-18

## 2024-06-18 RX ORDER — METFORMIN HYDROCHLORIDE 1000 MG/1
1000 TABLET ORAL
Qty: 180 TABLET | Refills: 3 | Status: SHIPPED | OUTPATIENT
Start: 2024-06-18

## 2024-06-18 RX ORDER — ATORVASTATIN CALCIUM 40 MG/1
40 TABLET, FILM COATED ORAL DAILY
Qty: 90 TABLET | Refills: 1 | Status: SHIPPED | OUTPATIENT
Start: 2024-06-18

## 2024-06-18 RX ORDER — DULAGLUTIDE 1.5 MG/.5ML
3 INJECTION, SOLUTION SUBCUTANEOUS
Qty: 4 ML | Refills: 3 | Status: SHIPPED | OUTPATIENT
Start: 2024-06-18 | End: 2024-06-18

## 2024-06-19 DIAGNOSIS — E11.9 TYPE 2 DIABETES MELLITUS WITHOUT COMPLICATION, WITHOUT LONG-TERM CURRENT USE OF INSULIN (MULTI): ICD-10-CM

## 2024-09-12 DIAGNOSIS — E11.9 DIABETES MELLITUS, STABLE (MULTI): ICD-10-CM

## 2024-09-12 RX ORDER — GLIMEPIRIDE 4 MG/1
4 TABLET ORAL 2 TIMES DAILY
Qty: 180 TABLET | Refills: 1 | Status: SHIPPED | OUTPATIENT
Start: 2024-09-12

## 2024-09-17 DIAGNOSIS — I10 PRIMARY HYPERTENSION: ICD-10-CM

## 2024-09-17 DIAGNOSIS — I10 ESSENTIAL (PRIMARY) HYPERTENSION: ICD-10-CM

## 2024-09-17 RX ORDER — VALSARTAN AND HYDROCHLOROTHIAZIDE 160; 12.5 MG/1; MG/1
1 TABLET, FILM COATED ORAL DAILY
Qty: 90 TABLET | Refills: 1 | Status: SHIPPED | OUTPATIENT
Start: 2024-09-17

## 2024-09-17 RX ORDER — METOPROLOL SUCCINATE 100 MG/1
100 TABLET, EXTENDED RELEASE ORAL DAILY
Qty: 90 TABLET | Refills: 1 | Status: SHIPPED | OUTPATIENT
Start: 2024-09-17

## 2024-10-23 ENCOUNTER — APPOINTMENT (OUTPATIENT)
Dept: PRIMARY CARE | Facility: CLINIC | Age: 54
End: 2024-10-23
Payer: COMMERCIAL

## 2024-10-23 ENCOUNTER — LAB (OUTPATIENT)
Dept: LAB | Facility: LAB | Age: 54
End: 2024-10-23
Payer: COMMERCIAL

## 2024-10-23 VITALS
OXYGEN SATURATION: 95 % | HEART RATE: 101 BPM | HEIGHT: 69 IN | SYSTOLIC BLOOD PRESSURE: 134 MMHG | DIASTOLIC BLOOD PRESSURE: 88 MMHG | WEIGHT: 312.83 LBS | BODY MASS INDEX: 46.33 KG/M2

## 2024-10-23 DIAGNOSIS — Z12.5 SCREENING PSA (PROSTATE SPECIFIC ANTIGEN): ICD-10-CM

## 2024-10-23 DIAGNOSIS — I10 PRIMARY HYPERTENSION: ICD-10-CM

## 2024-10-23 DIAGNOSIS — E11.9 TYPE 2 DIABETES MELLITUS WITHOUT COMPLICATION, WITHOUT LONG-TERM CURRENT USE OF INSULIN (MULTI): ICD-10-CM

## 2024-10-23 DIAGNOSIS — R05.9 COUGH, UNSPECIFIED: ICD-10-CM

## 2024-10-23 DIAGNOSIS — E11.9 DIABETES MELLITUS, STABLE (MULTI): ICD-10-CM

## 2024-10-23 DIAGNOSIS — R91.1 LUNG NODULE: ICD-10-CM

## 2024-10-23 DIAGNOSIS — I10 ESSENTIAL (PRIMARY) HYPERTENSION: ICD-10-CM

## 2024-10-23 DIAGNOSIS — E78.2 MIXED HYPERLIPIDEMIA: ICD-10-CM

## 2024-10-23 DIAGNOSIS — J45.991 COUGH VARIANT ASTHMA (HHS-HCC): ICD-10-CM

## 2024-10-23 DIAGNOSIS — Z00.00 ROUTINE GENERAL MEDICAL EXAMINATION AT A HEALTH CARE FACILITY: Primary | ICD-10-CM

## 2024-10-23 LAB
ANION GAP SERPL CALC-SCNC: 16 MMOL/L (ref 10–20)
BUN SERPL-MCNC: 10 MG/DL (ref 6–23)
CALCIUM SERPL-MCNC: 9.4 MG/DL (ref 8.6–10.6)
CHLORIDE SERPL-SCNC: 100 MMOL/L (ref 98–107)
CHOLEST SERPL-MCNC: 102 MG/DL (ref 0–199)
CHOLESTEROL/HDL RATIO: 2.9
CO2 SERPL-SCNC: 26 MMOL/L (ref 21–32)
CREAT SERPL-MCNC: 0.85 MG/DL (ref 0.5–1.3)
EGFRCR SERPLBLD CKD-EPI 2021: >90 ML/MIN/1.73M*2
EST. AVERAGE GLUCOSE BLD GHB EST-MCNC: 192 MG/DL
GLUCOSE SERPL-MCNC: 187 MG/DL (ref 74–99)
HBA1C MFR BLD: 8.3 %
HDLC SERPL-MCNC: 35.5 MG/DL
LDLC SERPL CALC-MCNC: 18 MG/DL
NON HDL CHOLESTEROL: 67 MG/DL (ref 0–149)
POTASSIUM SERPL-SCNC: 4.2 MMOL/L (ref 3.5–5.3)
PSA SERPL-MCNC: 0.55 NG/ML
SODIUM SERPL-SCNC: 138 MMOL/L (ref 136–145)
TRIGL SERPL-MCNC: 241 MG/DL (ref 0–149)
VLDL: 48 MG/DL (ref 0–40)

## 2024-10-23 PROCEDURE — 84153 ASSAY OF PSA TOTAL: CPT

## 2024-10-23 PROCEDURE — 80048 BASIC METABOLIC PNL TOTAL CA: CPT

## 2024-10-23 PROCEDURE — 3075F SYST BP GE 130 - 139MM HG: CPT | Performed by: PHYSICIAN ASSISTANT

## 2024-10-23 PROCEDURE — 80061 LIPID PANEL: CPT

## 2024-10-23 PROCEDURE — 83036 HEMOGLOBIN GLYCOSYLATED A1C: CPT

## 2024-10-23 PROCEDURE — 1036F TOBACCO NON-USER: CPT | Performed by: PHYSICIAN ASSISTANT

## 2024-10-23 PROCEDURE — 3008F BODY MASS INDEX DOCD: CPT | Performed by: PHYSICIAN ASSISTANT

## 2024-10-23 PROCEDURE — 3079F DIAST BP 80-89 MM HG: CPT | Performed by: PHYSICIAN ASSISTANT

## 2024-10-23 PROCEDURE — 3052F HG A1C>EQUAL 8.0%<EQUAL 9.0%: CPT | Performed by: PHYSICIAN ASSISTANT

## 2024-10-23 PROCEDURE — 99396 PREV VISIT EST AGE 40-64: CPT | Performed by: PHYSICIAN ASSISTANT

## 2024-10-23 PROCEDURE — 3060F POS MICROALBUMINURIA REV: CPT | Performed by: PHYSICIAN ASSISTANT

## 2024-10-23 PROCEDURE — 36415 COLL VENOUS BLD VENIPUNCTURE: CPT

## 2024-10-23 RX ORDER — ALBUTEROL SULFATE 90 UG/1
2 INHALANT RESPIRATORY (INHALATION) EVERY 6 HOURS PRN
Qty: 18 G | Refills: 6 | Status: SHIPPED | OUTPATIENT
Start: 2024-10-23

## 2024-10-23 RX ORDER — METOPROLOL SUCCINATE 100 MG/1
100 TABLET, EXTENDED RELEASE ORAL DAILY
Qty: 90 TABLET | Refills: 1 | Status: SHIPPED | OUTPATIENT
Start: 2024-10-23

## 2024-10-23 RX ORDER — ATORVASTATIN CALCIUM 40 MG/1
40 TABLET, FILM COATED ORAL DAILY
Qty: 90 TABLET | Refills: 1 | Status: SHIPPED | OUTPATIENT
Start: 2024-10-23

## 2024-10-23 RX ORDER — VALSARTAN AND HYDROCHLOROTHIAZIDE 160; 12.5 MG/1; MG/1
1 TABLET, FILM COATED ORAL DAILY
Qty: 90 TABLET | Refills: 1 | Status: SHIPPED | OUTPATIENT
Start: 2024-10-23

## 2024-10-23 RX ORDER — METFORMIN HYDROCHLORIDE 1000 MG/1
1000 TABLET ORAL 2 TIMES DAILY
Qty: 180 TABLET | Refills: 3 | Status: SHIPPED | OUTPATIENT
Start: 2024-10-23

## 2024-10-23 RX ORDER — GLIMEPIRIDE 4 MG/1
4 TABLET ORAL 2 TIMES DAILY
Qty: 180 TABLET | Refills: 1 | Status: SHIPPED | OUTPATIENT
Start: 2024-10-23

## 2024-10-23 ASSESSMENT — ENCOUNTER SYMPTOMS
ABDOMINAL PAIN: 0
SHORTNESS OF BREATH: 0
MUSCULOSKELETAL NEGATIVE: 1
ENDOCRINE NEGATIVE: 1
PHOTOPHOBIA: 0
OCCASIONAL FEELINGS OF UNSTEADINESS: 0
DEPRESSION: 0
ALLERGIC/IMMUNOLOGIC NEGATIVE: 1
PSYCHIATRIC NEGATIVE: 1
CONSTITUTIONAL NEGATIVE: 1
CARDIOVASCULAR NEGATIVE: 1
HEMATOLOGIC/LYMPHATIC NEGATIVE: 1
COUGH: 0
DIZZINESS: 0
NERVOUS/ANXIOUS: 0
NAUSEA: 0
GASTROINTESTINAL NEGATIVE: 1
PALPITATIONS: 0
ARTHRALGIAS: 0
RESPIRATORY NEGATIVE: 1
VOMITING: 0
WHEEZING: 0
NEUROLOGICAL NEGATIVE: 1
BACK PAIN: 0
HEADACHES: 0
LOSS OF SENSATION IN FEET: 0

## 2024-10-23 ASSESSMENT — PATIENT HEALTH QUESTIONNAIRE - PHQ9
1. LITTLE INTEREST OR PLEASURE IN DOING THINGS: NOT AT ALL
SUM OF ALL RESPONSES TO PHQ9 QUESTIONS 1 AND 2: 0
2. FEELING DOWN, DEPRESSED OR HOPELESS: NOT AT ALL

## 2024-10-23 NOTE — PROGRESS NOTES
Subjective   Patient ID: Jack Nolen is a 54 y.o. male who presents for Annual Exam.    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 - [facility]   DUE dental and vision DUE   QUIT tobacco dependency 1PPD x 30 years,- 1 year ago quit    + ETOH 1-2 x week social      PMhx significant medical hx Type 2 diabetes - mixed hyperlipidemia- HTN - obesity   Social hx: etoh- social weekly , tobacco use 1 PPD x 30+ years , no illicit drug use   watches diet and exercise- walks -   FMhx: mother- aortic valve replaceent CAD MI , father -CVA   Past medical, surgical, social, and family history all reviewed      patient states feeling well otherwise  denies fever, chills, N/V, headache, dizziness, CP, SOB, palpitations, edema, numbness, tingling, weakness  A chaperone was offered to the patient for the physical exam /  exam and was declined         Review of Systems   Constitutional: Negative.    HENT: Negative.     Eyes:  Positive for visual disturbance. Negative for photophobia.        Right eye blindness since birth    Respiratory: Negative.  Negative for cough, shortness of breath and wheezing.    Cardiovascular: Negative.  Negative for chest pain and palpitations.   Gastrointestinal: Negative.  Negative for abdominal pain, nausea and vomiting.   Endocrine: Negative.    Genitourinary: Negative.    Musculoskeletal: Negative.  Negative for arthralgias and back pain.   Skin: Negative.  Negative for rash.  "  Allergic/Immunologic: Negative.    Neurological: Negative.  Negative for dizziness and headaches.   Hematological: Negative.    Psychiatric/Behavioral: Negative.  The patient is not nervous/anxious.        Objective   /88 (BP Location: Left arm, Patient Position: Sitting)   Pulse 101   Ht 1.753 m (5' 9\")   Wt 142 kg (312 lb 13.3 oz)   SpO2 95%   BMI 46.20 kg/m²     Physical Exam  Vitals and nursing note reviewed.   Constitutional:       Appearance: Normal appearance. He is obese.   HENT:      Head: Normocephalic and atraumatic.      Right Ear: Tympanic membrane, ear canal and external ear normal.      Left Ear: Tympanic membrane, ear canal and external ear normal.      Nose: Nose normal.      Mouth/Throat:      Mouth: Mucous membranes are moist.      Pharynx: Oropharynx is clear.   Eyes:      Pupils: Pupils are equal, round, and reactive to light.   Cardiovascular:      Rate and Rhythm: Normal rate and regular rhythm.      Heart sounds: Normal heart sounds.   Pulmonary:      Effort: Pulmonary effort is normal.      Breath sounds: Normal breath sounds.   Abdominal:      General: Bowel sounds are normal.      Palpations: Abdomen is soft.   Musculoskeletal:         General: Normal range of motion.      Cervical back: Neck supple.      Comments: Trace edema     Skin:     General: Skin is warm.   Neurological:      General: No focal deficit present.      Mental Status: He is alert and oriented to person, place, and time.   Psychiatric:         Mood and Affect: Mood normal.         Behavior: Behavior normal.         Thought Content: Thought content normal.         Judgment: Judgment normal.       Assessment/Plan   Problem List Items Addressed This Visit       Type 2 diabetes mellitus without complication (Multi)    Relevant Medications    dulaglutide 3 mg/0.5 mL pen injector (Start on 10/27/2024)    metFORMIN (Glucophage) 1,000 mg tablet    Other Relevant Orders    Hemoglobin A1C    Lipid Panel    Basic " Metabolic Panel    Mixed hyperlipidemia    Relevant Medications    atorvastatin (Lipitor) 40 mg tablet    Cough variant asthma ([facility])     Other Visit Diagnoses       Routine general medical examination at a health care facility    -  Primary    Cough, unspecified        Relevant Medications    albuterol 90 mcg/actuation inhaler    Diabetes mellitus, stable (Multi)        Relevant Medications    glimepiride (Amaryl) 4 mg tablet    Primary hypertension        Relevant Medications    metoprolol succinate XL (Toprol-XL) 100 mg 24 hr tablet    Essential (primary) hypertension        Relevant Medications    valsartan-hydrochlorothiazide (Diovan-HCT) 160-12.5 mg tablet    Screening PSA (prostate specific antigen)        Relevant Orders    Prostate Specific Antigen    Lung nodule        Relevant Orders    CT lung screening low dose             Diagnoses/Problems  well exam   - patient stable and healthy  - discussed healthy diet and exercise plan   - continue medications as directed, SEs, risks and options discussed   - f/u with specialists as directed   - DUE dental and vision exams, f/u as directed   - Labs ordered today, pt advised to call office when results are available to discuss with provider   -Vaccinations due for shingrex - flu - declines all   -Follow-up annual exam in one year  -Colon cancer screening - uTD 10/2022 [facility] - due 5 years 2027  -Follow-up in one week to discuss all results   Ct calcium - 4 / 2021  Ct chest DUE - pt did not go last year            Type 2 diabetes mellitus, uncontrolled (250.02) (E11.65)    DM w/o complication type II (250.00) (E11.9)  -Stable, poor control, BS in office today 146   -Continue current medications, side effects, risks and options discussed with patient   -Blood glucose monitoring daily advised  -Ophthalmology for evaluation and management of diabetic eye changes   -Encouraged regular aerobic exercise weight loss and diabetic diet ADA  -Discussed diabetic education  issues of long-term diabetic complications, hyperglycemic symptoms, diet, and dictations-side effects and need for compliance, importance of exercise  -Follow up in 4 weeks should any issues arise before then f/u sooner   -increase trulicity to 1.5 weekly injection   may need to decrease oral meds   monitor BS call if lower than 70 or above 180   Patient verbalized understanding and agrees to plan of care.          Benign essential hypertension (401.1) (I10)  -stable  -Fair control  -Continue current medications, side effects, risks and options discussed, patient aware   -Encouraged dietary sodium restriction/ DASH diet  -Recommend regular aerobic exercise  -Discussed need and benefit for weight loss  -Recheck in 3- 4 months or sooner should any symptoms or problems arise  - Goal of blood pressure less than 130/80          Mixed hyperlipidemia (272.2) (E78.2)  -patient non compliant with medication, advised of risks and advised patient to start statin   -Encouraged following a low-fat low-cholesterol diet   -Discussed the benefits of regular aerobic exercise and weight loss  -Encouraged following a low-carb healthy oil intake diet  - last lipid panel 12/2020- due for fasting labs        Morbid obesity with BMI of 40.0-44.9, adult (278.01,V85.41) (E66.01,Z68.41)  -Discussed with the patient significance of elevated BMI and obesity. Emphasized cardiovascular risk   pt to follow ADA diet and exercise plan      tobacco depend  -CT low does chest - due  -Cessation encouraged, You need to stop smoking. Though it is not easy, more than half of all adult smokers  have quit. We encourage you to write down all the reasons you should quit smoking and  set a quit date for yourself. Ask us how we can help. You may also call  8-641-QUIT-NOW for free resources and assistance  --physiological and physical aspects of tobacco addiction as well as strategies for quitting were discussed  -Counseling was given focusing on the harmful  effects of this addiction especially given the patient's medical conditions which will be worsened because of that, goals and tobacco     Results of my examination, clinical findings, impression and diagnoses discussed with the patient as well as results of the latest blood work. The patient was in agreement with the plan and verbalized a good understanding of instructions and plans for treatment. At the end of the visit today, the patient felt that all questions had been answered satisfactorily. The patient was pleased with the visit and very appreciative for the care rendered.

## 2024-10-25 ENCOUNTER — TELEPHONE (OUTPATIENT)
Dept: PRIMARY CARE | Facility: CLINIC | Age: 54
End: 2024-10-25

## 2024-11-18 ENCOUNTER — LAB (OUTPATIENT)
Dept: LAB | Facility: LAB | Age: 54
End: 2024-11-18
Payer: COMMERCIAL

## 2024-11-18 ENCOUNTER — OFFICE VISIT (OUTPATIENT)
Dept: PRIMARY CARE | Facility: CLINIC | Age: 54
End: 2024-11-18
Payer: COMMERCIAL

## 2024-11-18 VITALS
WEIGHT: 310.85 LBS | HEART RATE: 91 BPM | OXYGEN SATURATION: 96 % | HEIGHT: 69 IN | DIASTOLIC BLOOD PRESSURE: 84 MMHG | SYSTOLIC BLOOD PRESSURE: 138 MMHG | BODY MASS INDEX: 46.04 KG/M2

## 2024-11-18 DIAGNOSIS — E11.9 TYPE 2 DIABETES MELLITUS WITHOUT COMPLICATION, WITHOUT LONG-TERM CURRENT USE OF INSULIN (MULTI): ICD-10-CM

## 2024-11-18 DIAGNOSIS — H93.13 TINNITUS OF BOTH EARS: Primary | ICD-10-CM

## 2024-11-18 DIAGNOSIS — H93.13 RINGING IN EARS, BILATERAL: ICD-10-CM

## 2024-11-18 DIAGNOSIS — M54.2 NECK PAIN: ICD-10-CM

## 2024-11-18 DIAGNOSIS — I10 BENIGN ESSENTIAL HYPERTENSION: ICD-10-CM

## 2024-11-18 DIAGNOSIS — E66.01 MORBID OBESITY (MULTI): ICD-10-CM

## 2024-11-18 DIAGNOSIS — H93.13 TINNITUS OF BOTH EARS: ICD-10-CM

## 2024-11-18 LAB
ANION GAP SERPL CALC-SCNC: 17 MMOL/L (ref 10–20)
BUN SERPL-MCNC: 10 MG/DL (ref 6–23)
CALCIUM SERPL-MCNC: 9.7 MG/DL (ref 8.6–10.6)
CHLORIDE SERPL-SCNC: 98 MMOL/L (ref 98–107)
CO2 SERPL-SCNC: 25 MMOL/L (ref 21–32)
CREAT SERPL-MCNC: 0.89 MG/DL (ref 0.5–1.3)
EGFRCR SERPLBLD CKD-EPI 2021: >90 ML/MIN/1.73M*2
ERYTHROCYTE [DISTWIDTH] IN BLOOD BY AUTOMATED COUNT: 12.3 % (ref 11.5–14.5)
GLUCOSE SERPL-MCNC: 130 MG/DL (ref 74–99)
HCT VFR BLD AUTO: 41.7 % (ref 41–52)
HGB BLD-MCNC: 14.5 G/DL (ref 13.5–17.5)
MCH RBC QN AUTO: 32.8 PG (ref 26–34)
MCHC RBC AUTO-ENTMCNC: 34.8 G/DL (ref 32–36)
MCV RBC AUTO: 94 FL (ref 80–100)
NRBC BLD-RTO: 0 /100 WBCS (ref 0–0)
PLATELET # BLD AUTO: 274 X10*3/UL (ref 150–450)
POC FINGERSTICK BLOOD GLUCOSE: 183 MG/DL (ref 70–100)
POTASSIUM SERPL-SCNC: 3.9 MMOL/L (ref 3.5–5.3)
RBC # BLD AUTO: 4.42 X10*6/UL (ref 4.5–5.9)
SODIUM SERPL-SCNC: 136 MMOL/L (ref 136–145)
WBC # BLD AUTO: 8.4 X10*3/UL (ref 4.4–11.3)

## 2024-11-18 PROCEDURE — 3048F LDL-C <100 MG/DL: CPT | Performed by: PHYSICIAN ASSISTANT

## 2024-11-18 PROCEDURE — 1036F TOBACCO NON-USER: CPT | Performed by: PHYSICIAN ASSISTANT

## 2024-11-18 PROCEDURE — 3079F DIAST BP 80-89 MM HG: CPT | Performed by: PHYSICIAN ASSISTANT

## 2024-11-18 PROCEDURE — 99214 OFFICE O/P EST MOD 30 MIN: CPT | Performed by: PHYSICIAN ASSISTANT

## 2024-11-18 PROCEDURE — 3075F SYST BP GE 130 - 139MM HG: CPT | Performed by: PHYSICIAN ASSISTANT

## 2024-11-18 PROCEDURE — 3008F BODY MASS INDEX DOCD: CPT | Performed by: PHYSICIAN ASSISTANT

## 2024-11-18 PROCEDURE — 80048 BASIC METABOLIC PNL TOTAL CA: CPT

## 2024-11-18 PROCEDURE — 85027 COMPLETE CBC AUTOMATED: CPT

## 2024-11-18 PROCEDURE — 3060F POS MICROALBUMINURIA REV: CPT | Performed by: PHYSICIAN ASSISTANT

## 2024-11-18 PROCEDURE — 3052F HG A1C>EQUAL 8.0%<EQUAL 9.0%: CPT | Performed by: PHYSICIAN ASSISTANT

## 2024-11-18 PROCEDURE — 36415 COLL VENOUS BLD VENIPUNCTURE: CPT

## 2024-11-18 PROCEDURE — 82962 GLUCOSE BLOOD TEST: CPT | Performed by: PHYSICIAN ASSISTANT

## 2024-11-18 RX ORDER — FLUTICASONE PROPIONATE 50 MCG
1 SPRAY, SUSPENSION (ML) NASAL DAILY
Qty: 16 G | Refills: 1 | Status: SHIPPED | OUTPATIENT
Start: 2024-11-18 | End: 2025-11-18

## 2024-11-18 ASSESSMENT — ENCOUNTER SYMPTOMS
ALLERGIC/IMMUNOLOGIC NEGATIVE: 1
BACK PAIN: 0
SHORTNESS OF BREATH: 0
LOSS OF SENSATION IN FEET: 0
MUSCULOSKELETAL NEGATIVE: 1
ENDOCRINE NEGATIVE: 1
ARTHRALGIAS: 0
WHEEZING: 0
VOMITING: 0
CONSTITUTIONAL NEGATIVE: 1
PSYCHIATRIC NEGATIVE: 1
NUMBNESS: 0
NERVOUS/ANXIOUS: 0
TREMORS: 0
EYES NEGATIVE: 1
PALPITATIONS: 0
HEMATOLOGIC/LYMPHATIC NEGATIVE: 1
ABDOMINAL PAIN: 0
COUGH: 0
DEPRESSION: 0
WEAKNESS: 0
FACIAL ASYMMETRY: 0
HEADACHES: 1
SEIZURES: 0
DIZZINESS: 0
NAUSEA: 0
LIGHT-HEADEDNESS: 0
RESPIRATORY NEGATIVE: 1
OCCASIONAL FEELINGS OF UNSTEADINESS: 0
SPEECH DIFFICULTY: 0

## 2024-11-18 NOTE — PROGRESS NOTES
Subjective   Patient ID: Jack Workman is a 54 y.o. male who presents for Headache (Head pressure, ringing in both ears, x 2 weeks ).    Headache   Pertinent negatives include no abdominal pain, back pain, coughing, dizziness, nausea, numbness, seizures, vomiting or weakness.      Patient Jack Workman 54 y.o. male is here today for     Ringing in ears and head pressure /sinus pressure x few weeks  Some dull pressure headaches at tmes with some nausea too   No OTC medications   No dizziness  no loc no diplopia   No recent travel or illness   No radiation of pain - no numbness or tingling inext   No jaw pain          controlled diabetes with Trulicity metformin BID and glimepiride BID BS at home   last eye exam 2022 DUE - not taking BS at home - last A1c 8.2 - 2/2024     HTN- taking diovan-and toprol  controlled no SEs     + tobacco dependency 1PPD x 30 years quit over a year ago   , + ETOH 1-2 x week social     patient states feeling well otherwise  denies fever, chills, N/V, headache, dizziness, CP, SOB, palpitations, edema, numbness, tingling, weakness  A chaperone was offered to the patient for the physical exam /  exam and was declined     \Review of Systems   Constitutional: Negative.    HENT: Negative.     Eyes: Negative.    Respiratory: Negative.  Negative for cough, shortness of breath and wheezing.    Cardiovascular:  Negative for chest pain and palpitations.   Gastrointestinal:  Negative for abdominal pain, nausea and vomiting.   Endocrine: Negative.    Genitourinary: Negative.    Musculoskeletal: Negative.  Negative for arthralgias and back pain.   Skin: Negative.  Negative for rash.   Allergic/Immunologic: Negative.    Neurological:  Positive for headaches. Negative for dizziness, tremors, seizures, syncope, facial asymmetry, speech difficulty, weakness, light-headedness and numbness.   Hematological: Negative.    Psychiatric/Behavioral: Negative.  The patient is not nervous/anxious.   "      Objective   /84 (BP Location: Right arm, Patient Position: Sitting)   Pulse 91   Ht 1.753 m (5' 9\")   Wt 141 kg (310 lb 13.6 oz)   SpO2 96%   BMI 45.90 kg/m²     Physical Exam  Vitals and nursing note reviewed.   Constitutional:       Appearance: Normal appearance.   HENT:      Head: Normocephalic and atraumatic.      Right Ear: Tympanic membrane, ear canal and external ear normal. There is no impacted cerumen.      Left Ear: Tympanic membrane, ear canal and external ear normal. There is no impacted cerumen.   Eyes:      Extraocular Movements: Extraocular movements intact.      Pupils: Pupils are equal, round, and reactive to light.   Cardiovascular:      Rate and Rhythm: Normal rate and regular rhythm.   Pulmonary:      Effort: Pulmonary effort is normal.      Breath sounds: Normal breath sounds.   Abdominal:      General: Bowel sounds are normal.   Musculoskeletal:      Cervical back: Neck supple.   Neurological:      General: No focal deficit present.      Mental Status: He is alert and oriented to person, place, and time.      Cranial Nerves: No cranial nerve deficit.      Sensory: No sensory deficit.      Motor: No weakness.      Coordination: Coordination normal.      Gait: Gait normal.      Deep Tendon Reflexes: Reflexes normal.   Psychiatric:         Mood and Affect: Mood normal.         Behavior: Behavior normal.         Thought Content: Thought content normal.         Judgment: Judgment normal.         Assessment/Plan   Problem List Items Addressed This Visit       Benign essential hypertension    Type 2 diabetes mellitus without complication (Multi)    Relevant Orders    CBC     Other Visit Diagnoses       Tinnitus of both ears    -  Primary    Relevant Orders    CBC    Basic Metabolic Panel    Referral to ENT    Morbid obesity (Multi)        Ringing in ears, bilateral        Relevant Medications    fluticasone (Flonase) 50 mcg/actuation nasal spray    Other Relevant Orders    CBC    " Basic Metabolic Panel    Referral to ENT    Neck pain        Relevant Orders    XR cervical spine 2-3 views          ER advised if sxs persist or worsen xray neck   Tylenol or motrin as neeed   Flonase daily   Refer to ent   Labs ordered today, pt advised to call office when results are available to discuss with provider    BS in office 138 today     complexity visit of 30+  minutes face-to-face with at least half of the time discussing  Results of my examination, clinical findings, impression and diagnoses discussed with the patient as well as results of the latest test/lab work. The patient was in agreement with the plan and verbalized a good understanding of instructions and plans for treatment. At the end of the visit today, the patient felt that all questions had been answered satisfactorily. The patient was pleased with the visit and very appreciative for the care rendered.

## 2024-11-20 ENCOUNTER — HOSPITAL ENCOUNTER (OUTPATIENT)
Dept: RADIOLOGY | Facility: CLINIC | Age: 54
Discharge: HOME | End: 2024-11-20
Payer: COMMERCIAL

## 2024-11-20 DIAGNOSIS — R91.1 LUNG NODULE: ICD-10-CM

## 2024-11-20 DIAGNOSIS — M54.2 NECK PAIN: ICD-10-CM

## 2024-11-20 PROCEDURE — 71271 CT THORAX LUNG CANCER SCR C-: CPT | Performed by: RADIOLOGY

## 2024-11-20 PROCEDURE — 71271 CT THORAX LUNG CANCER SCR C-: CPT

## 2024-11-20 PROCEDURE — 72050 X-RAY EXAM NECK SPINE 4/5VWS: CPT

## 2024-11-20 PROCEDURE — 72050 X-RAY EXAM NECK SPINE 4/5VWS: CPT | Performed by: RADIOLOGY

## 2024-11-21 NOTE — RESULT ENCOUNTER NOTE
Please call and inform - CT chest results  Few small bilateral noncalcified pulmonary nodules measuring up  to 5 mm, likely benign. Continued screening with low-dose noncontrast  chest CT in 12 months (from current date) is recommended. And  Cholelithiasis.

## 2024-12-17 DIAGNOSIS — H93.13 RINGING IN EARS, BILATERAL: ICD-10-CM

## 2024-12-17 RX ORDER — FLUTICASONE PROPIONATE 50 MCG
SPRAY, SUSPENSION (ML) NASAL
Qty: 48 ML | Refills: 1 | Status: SHIPPED | OUTPATIENT
Start: 2024-12-17 | End: 2024-12-19

## 2024-12-19 RX ORDER — FLUTICASONE PROPIONATE 50 MCG
SPRAY, SUSPENSION (ML) NASAL
Qty: 48 ML | Refills: 1 | Status: SHIPPED | OUTPATIENT
Start: 2024-12-19

## 2025-02-13 ENCOUNTER — APPOINTMENT (OUTPATIENT)
Dept: PRIMARY CARE | Facility: CLINIC | Age: 55
End: 2025-02-13
Payer: COMMERCIAL

## 2025-02-13 VITALS
HEIGHT: 69 IN | WEIGHT: 308.86 LBS | SYSTOLIC BLOOD PRESSURE: 132 MMHG | BODY MASS INDEX: 45.75 KG/M2 | HEART RATE: 86 BPM | OXYGEN SATURATION: 95 % | DIASTOLIC BLOOD PRESSURE: 80 MMHG

## 2025-02-13 DIAGNOSIS — I10 BENIGN ESSENTIAL HYPERTENSION: ICD-10-CM

## 2025-02-13 DIAGNOSIS — E11.49 TYPE 2 DIABETES MELLITUS WITH OTHER DIABETIC NEUROLOGICAL COMPLICATION: ICD-10-CM

## 2025-02-13 DIAGNOSIS — E78.2 MIXED HYPERLIPIDEMIA: ICD-10-CM

## 2025-02-13 DIAGNOSIS — J45.991 COUGH VARIANT ASTHMA (HHS-HCC): ICD-10-CM

## 2025-02-13 DIAGNOSIS — E11.9 TYPE 2 DIABETES MELLITUS WITHOUT COMPLICATION, WITHOUT LONG-TERM CURRENT USE OF INSULIN (MULTI): Primary | ICD-10-CM

## 2025-02-13 PROCEDURE — 3075F SYST BP GE 130 - 139MM HG: CPT | Performed by: PHYSICIAN ASSISTANT

## 2025-02-13 PROCEDURE — 3079F DIAST BP 80-89 MM HG: CPT | Performed by: PHYSICIAN ASSISTANT

## 2025-02-13 PROCEDURE — 3008F BODY MASS INDEX DOCD: CPT | Performed by: PHYSICIAN ASSISTANT

## 2025-02-13 PROCEDURE — 99214 OFFICE O/P EST MOD 30 MIN: CPT | Performed by: PHYSICIAN ASSISTANT

## 2025-02-13 ASSESSMENT — PATIENT HEALTH QUESTIONNAIRE - PHQ9
2. FEELING DOWN, DEPRESSED OR HOPELESS: NOT AT ALL
1. LITTLE INTEREST OR PLEASURE IN DOING THINGS: NOT AT ALL
SUM OF ALL RESPONSES TO PHQ9 QUESTIONS 1 AND 2: 0

## 2025-02-13 ASSESSMENT — ENCOUNTER SYMPTOMS
OCCASIONAL FEELINGS OF UNSTEADINESS: 0
ARTHRALGIAS: 0
PALPITATIONS: 0
LOSS OF SENSATION IN FEET: 0
CARDIOVASCULAR NEGATIVE: 1
NERVOUS/ANXIOUS: 0
COUGH: 0
DEPRESSION: 0
DIZZINESS: 0
NAUSEA: 0
VOMITING: 0
BACK PAIN: 0
GASTROINTESTINAL NEGATIVE: 1
ALLERGIC/IMMUNOLOGIC NEGATIVE: 1
WHEEZING: 0
SHORTNESS OF BREATH: 0
ABDOMINAL PAIN: 0
ENDOCRINE NEGATIVE: 1
PSYCHIATRIC NEGATIVE: 1
MUSCULOSKELETAL NEGATIVE: 1
EYES NEGATIVE: 1
RESPIRATORY NEGATIVE: 1
HEADACHES: 0
CONSTITUTIONAL NEGATIVE: 1
HEMATOLOGIC/LYMPHATIC NEGATIVE: 1
NEUROLOGICAL NEGATIVE: 1

## 2025-02-13 NOTE — PROGRESS NOTES
"Subjective   Patient ID: Jack Workman is a 55 y.o. male who presents for Follow-up.    HPI   Patient Jack Workman 55 y.o. male is here today for        controlled diabetes with Trulicity 3mg -  metformin BID and glimepiride BID BS at home   last eye exam UTD 1/2025 - not taking BS at home - last A1c 8.3-  oct 2024     HTN- taking diovan-and toprol  controlled no Ses- no cp  no sob no edema no headache     Mixed hyperlidemia- stable - lasat lipid 10/2024- on statin      Quit  2 yers ago  tobacco dependency 1PPD x 30 years quit over a year ago   , + ETOH 1-2 x week social      patient states feeling well otherwise  denies fever, chills, N/V, headache, dizziness, CP, SOB, palpitations, edema, numbness, tingling, weakness  A chaperone was offered to the patient for the physical exam /  exam and was declined       Review of Systems   Constitutional: Negative.    HENT: Negative.     Eyes: Negative.    Respiratory: Negative.  Negative for cough, shortness of breath and wheezing.    Cardiovascular: Negative.  Negative for chest pain and palpitations.   Gastrointestinal: Negative.  Negative for abdominal pain, nausea and vomiting.   Endocrine: Negative.    Genitourinary: Negative.    Musculoskeletal: Negative.  Negative for arthralgias and back pain.   Skin: Negative.  Negative for rash.   Allergic/Immunologic: Negative.    Neurological: Negative.  Negative for dizziness and headaches.   Hematological: Negative.    Psychiatric/Behavioral: Negative.  The patient is not nervous/anxious.        Objective   /80 (BP Location: Right arm, Patient Position: Sitting)   Pulse 86   Ht 1.753 m (5' 9\")   Wt 140 kg (308 lb 13.8 oz)   SpO2 95%   BMI 45.61 kg/m²     Physical Exam  Vitals and nursing note reviewed.   Constitutional:       Appearance: Normal appearance. He is obese.   Cardiovascular:      Rate and Rhythm: Normal rate.   Pulmonary:      Effort: Pulmonary effort is normal.      Breath sounds: " Normal breath sounds.   Abdominal:      General: Bowel sounds are normal.   Musculoskeletal:         General: Normal range of motion.      Cervical back: Neck supple.   Skin:     General: Skin is warm and dry.   Neurological:      General: No focal deficit present.      Mental Status: He is alert and oriented to person, place, and time.   Psychiatric:         Mood and Affect: Mood normal.         Behavior: Behavior normal.         Thought Content: Thought content normal.         Judgment: Judgment normal.       Assessment/Plan   Problem List Items Addressed This Visit       Benign essential hypertension    Type 2 diabetes mellitus without complication (Multi) - Primary    Relevant Orders    Comprehensive Metabolic Panel    Hemoglobin A1C    Mixed hyperlipidemia    Cough variant asthma ([facility])    Type 2 diabetes mellitus with other diabetic neurological complication      • Type 2 diabetes mellitus, uncontrolled (250.02) (E11.65)   • DM w/o complication type II (250.00) (E11.9)  -Stable, poor control  -Continue current medications, side effects, risks and options discussed with patient   -Blood glucose monitoring daily advised  -Ophthalmology for evaluation and management of diabetic eye changes   -Encouraged regular aerobic exercise weight loss and diabetic diet ADA  -Discussed diabetic education issues of long-term diabetic complications, hyperglycemic symptoms, diet, and dictations-side effects and need for compliance, importance of exercise  -Follow up in 4 weeks should any issues arise before then f/u sooner   - trulicity 3 weekly injection   may need to decrease oral meds   monitor BS call if lower than 70 or above 180   Patient verbalized understanding and agrees to plan of care.         • Benign essential hypertension (401.1) (I10)  -stable  -good control  -Continue current medications, side effects, risks and options discussed, patient aware   -Encouraged dietary sodium restriction/ DASH diet  -Recommend  regular aerobic exercise  -Discussed need and benefit for weight loss  -Recheck in 3- 4 months or sooner should any symptoms or problems arise  - Goal of blood pressure less than 130/80         • Mixed hyperlipidemia (272.2) (E78.2)  -patient non compliant with medication, advised of risks and advised patient to start statin   -Encouraged following a low-fat low-cholesterol diet   -Discussed the benefits of regular aerobic exercise and weight loss  -Encouraged following a low-carb healthy oil intake diet  - last lipid panel 12/2020- due for fasting labs       • Morbid obesity with BMI of 40.0-44.9, adult (278.01,V85.41) (E66.01,Z68.41)  -Discussed with the patient significance of elevated BMI and obesity. Emphasized cardiovascular risk   pt to follow ADA diet and exercise plan     Rash on thighs-- itchy and painful -  pt to sees his dermatologist     Results of my examination, clinical findings, impression and diagnoses discussed with the patient as well as results of the latest blood work. The patient was in agreement with the plan and verbalized a good understanding of instructions and plans for treatment. At the end of the visit today, the patient felt that all questions had been answered satisfactorily. The patient was pleased with the visit and very appreciative for the care rendered.

## 2025-02-14 LAB
ALBUMIN SERPL-MCNC: 4.6 G/DL (ref 3.6–5.1)
ALP SERPL-CCNC: 65 U/L (ref 35–144)
ALT SERPL-CCNC: 23 U/L (ref 9–46)
ANION GAP SERPL CALCULATED.4IONS-SCNC: 11 MMOL/L (CALC) (ref 7–17)
AST SERPL-CCNC: 13 U/L (ref 10–35)
BILIRUB SERPL-MCNC: 1.5 MG/DL (ref 0.2–1.2)
BUN SERPL-MCNC: 10 MG/DL (ref 7–25)
CALCIUM SERPL-MCNC: 9.5 MG/DL (ref 8.6–10.3)
CHLORIDE SERPL-SCNC: 98 MMOL/L (ref 98–110)
CO2 SERPL-SCNC: 27 MMOL/L (ref 20–32)
CREAT SERPL-MCNC: 0.86 MG/DL (ref 0.7–1.3)
EGFRCR SERPLBLD CKD-EPI 2021: 102 ML/MIN/1.73M2
EST. AVERAGE GLUCOSE BLD GHB EST-MCNC: 243 MG/DL
EST. AVERAGE GLUCOSE BLD GHB EST-SCNC: 13.5 MMOL/L
GLUCOSE SERPL-MCNC: 204 MG/DL (ref 65–99)
HBA1C MFR BLD: 10.1 % OF TOTAL HGB
POTASSIUM SERPL-SCNC: 4.4 MMOL/L (ref 3.5–5.3)
PROT SERPL-MCNC: 7.4 G/DL (ref 6.1–8.1)
SODIUM SERPL-SCNC: 136 MMOL/L (ref 135–146)

## 2025-02-18 ENCOUNTER — TELEMEDICINE (OUTPATIENT)
Dept: PRIMARY CARE | Facility: CLINIC | Age: 55
End: 2025-02-18
Payer: COMMERCIAL

## 2025-02-18 DIAGNOSIS — I10 PRIMARY HYPERTENSION: ICD-10-CM

## 2025-02-18 DIAGNOSIS — J06.9 URI WITH COUGH AND CONGESTION: ICD-10-CM

## 2025-02-18 DIAGNOSIS — G47.33 OBSTRUCTIVE SLEEP APNEA SYNDROME: ICD-10-CM

## 2025-02-18 DIAGNOSIS — E11.65 UNCONTROLLED TYPE 2 DIABETES MELLITUS WITH HYPERGLYCEMIA: Primary | ICD-10-CM

## 2025-02-18 PROCEDURE — 1036F TOBACCO NON-USER: CPT | Performed by: PHYSICIAN ASSISTANT

## 2025-02-18 PROCEDURE — 99213 OFFICE O/P EST LOW 20 MIN: CPT | Performed by: PHYSICIAN ASSISTANT

## 2025-02-18 ASSESSMENT — ENCOUNTER SYMPTOMS
HEADACHES: 0
ARTHRALGIAS: 0
ABDOMINAL PAIN: 0
TROUBLE SWALLOWING: 0
ENDOCRINE NEGATIVE: 1
ALLERGIC/IMMUNOLOGIC NEGATIVE: 1
SHORTNESS OF BREATH: 0
RHINORRHEA: 1
NAUSEA: 0
COUGH: 1
FATIGUE: 1
NEUROLOGICAL NEGATIVE: 1
HEMATOLOGIC/LYMPHATIC NEGATIVE: 1
BACK PAIN: 0
SORE THROAT: 0
NERVOUS/ANXIOUS: 0
WHEEZING: 0
MUSCULOSKELETAL NEGATIVE: 1
PSYCHIATRIC NEGATIVE: 1
PALPITATIONS: 0
SINUS PAIN: 0
VOMITING: 0
DIZZINESS: 0
SINUS PRESSURE: 0
EYES NEGATIVE: 1

## 2025-02-18 NOTE — PROGRESS NOTES
Subjective   Patient ID: Jack Workman is a 55 y.o. male who presents for URI and Follow-up.  An interactive audio and video telecommunication system which permits real time communications between the patient (at the originating site) and provider (at the distant site) was utilized to provide this telehealth service.    Verbal consent was requested and obtained from Jack Workman  on this date, 02/18/25  , for a telehealth visit.        HPI Patient Jack Workman 55 y.o. male is here today for follow up     Elevated A1c -- no symptoms - -- no increased thirst or urination -- wt ?? - admits to poor diet and lack of exercise        URIo headache  cough - runny nose clear drainage - x 2-3 days     Uncontrolled diabetes with Trulicity 3mg -  metformin BID and glimepiride BID BS at home   last eye exam UTD 1/2025 - not taking BS at home - last A1c 8.3-  oct 2024     HTN- taking diovan-and toprol  controlled no Ses- no cp  no sob no edema no headache      Mixed hyperlidemia- stable - lasat lipid 10/2024- on statin      Quit  2 yers ago  tobacco dependency 1PPD x 30 years quit over a year ago   , + ETOH 1-2 x week social      Review of Systems   Constitutional:  Positive for fatigue.   HENT:  Positive for congestion, postnasal drip and rhinorrhea. Negative for ear pain, sinus pressure, sinus pain, sore throat and trouble swallowing.    Eyes: Negative.    Respiratory:  Positive for cough. Negative for shortness of breath and wheezing.    Cardiovascular:  Negative for chest pain and palpitations.   Gastrointestinal:  Negative for abdominal pain, nausea and vomiting.   Endocrine: Negative.    Genitourinary: Negative.    Musculoskeletal: Negative.  Negative for arthralgias and back pain.   Skin: Negative.  Negative for rash.   Allergic/Immunologic: Negative.    Neurological: Negative.  Negative for dizziness and headaches.   Hematological: Negative.    Psychiatric/Behavioral: Negative.  The patient is  not nervous/anxious.        Objective   There were no vitals taken for this visit.    Physical Exam  Constitutional:       Appearance: He is ill-appearing.   Neurological:      Mental Status: He is alert and oriented to person, place, and time.   Psychiatric:         Mood and Affect: Mood normal.         Behavior: Behavior normal.         Thought Content: Thought content normal.         Judgment: Judgment normal.       Assessment/Plan   Problem List Items Addressed This Visit       Obstructive sleep apnea syndrome     Other Visit Diagnoses       Uncontrolled type 2 diabetes mellitus with hyperglycemia    -  Primary    Relevant Orders    Referral to Endocrinology    Primary hypertension        URI with cough and congestion                Uncontrolled DM   Refer to endo - A1c elevated 10.1     ASSESSMENT:   viral upper respiratory illness    PLAN:  Symptomatic therapy suggested: push fluids, rest, and return office visit prn if symptoms persist or worsen. Lack of antibiotic effectiveness discussed with him. Call or return to clinic prn if these symptoms worsen or fail to improve as anticipated.  -Suspect viral  -Discussed viral etiology and rationale for treatment  -Symptomatic treatment with when necessary analgesia  -Supportive care with fluids and rest  -Patient may also use over-the-counter cough and cold medications as needed and warm salt water gargles, throat lozenges and/or over-the-counter throat spray as needed  -Follow-up in 3-5 days if symptoms persist or sooner if worsening of symptoms  - declines swabs in office today         This visit was completed via [email or web address] due to the restrictions of the COVID-19 pandemic. All issues as below were discussed and addressed but no physical exam was performed. If it was felt that the patient should be evaluated in clinic then they were directed there. The patient verbally consented to visit. Spent a total of 20 minutes with patient on [email or web address] discussing health  concerns & more than 50% of this time was spent in counseling & coordination of care.

## 2025-02-18 NOTE — RESULT ENCOUNTER NOTE
Patient to be seen soon as possible regarding lab work results elevated A1c now to 10   we will need to refer to endocrinology dietitian nutrition  Needs to follow closer ADA diet exercise weight loss and change in medications

## 2025-02-25 ENCOUNTER — PATIENT OUTREACH (OUTPATIENT)
Dept: CARE COORDINATION | Facility: CLINIC | Age: 55
End: 2025-02-25
Payer: COMMERCIAL

## 2025-03-04 ENCOUNTER — PATIENT OUTREACH (OUTPATIENT)
Dept: CARE COORDINATION | Facility: CLINIC | Age: 55
End: 2025-03-04
Payer: COMMERCIAL

## 2025-03-04 NOTE — PROGRESS NOTES
Left message, on preferred telephone number, requesting return call to set up an appointment for diabetes self management education. Letter will be sent if no return call.

## 2025-03-11 ENCOUNTER — PATIENT OUTREACH (OUTPATIENT)
Dept: CARE COORDINATION | Facility: CLINIC | Age: 55
End: 2025-03-11
Payer: COMMERCIAL

## 2025-03-11 NOTE — PROGRESS NOTES
Letter has been sent to listed home address requesting return call to set up an appointment for diabetes self management education. Diabetes Management Program will be closed, at this time, due to two previous unreturned calls.

## 2025-03-26 ENCOUNTER — TELEMEDICINE (OUTPATIENT)
Dept: PRIMARY CARE | Facility: CLINIC | Age: 55
End: 2025-03-26
Payer: COMMERCIAL

## 2025-03-26 ENCOUNTER — TELEPHONE (OUTPATIENT)
Dept: PRIMARY CARE | Facility: CLINIC | Age: 55
End: 2025-03-26

## 2025-03-26 DIAGNOSIS — J06.9 URI WITH COUGH AND CONGESTION: Primary | ICD-10-CM

## 2025-03-26 PROCEDURE — 1036F TOBACCO NON-USER: CPT | Performed by: PHYSICIAN ASSISTANT

## 2025-03-26 PROCEDURE — 99213 OFFICE O/P EST LOW 20 MIN: CPT | Performed by: PHYSICIAN ASSISTANT

## 2025-03-26 RX ORDER — AZITHROMYCIN 250 MG/1
250 TABLET, FILM COATED ORAL DAILY
Qty: 6 TABLET | Refills: 0 | Status: SHIPPED | OUTPATIENT
Start: 2025-03-26 | End: 2025-04-01

## 2025-03-26 NOTE — PROGRESS NOTES
Subjective   Patient ID: Jack Workman is a 55 y.o. male who presents for No chief complaint on file..  An interactive audio and video telecommunication system which permits real time communications between the patient (at the originating site) and provider (at the distant site) was utilized to provide this telehealth service.    Verbal consent was requested and obtained from Jack Workman  on this date, 03/26/25  , for a telehealth visit.      HPI   SUBJECTIVE:   Jack Workman is a 55 y.o. male who complains of congestion, sneezing, and sore throat for 1 days. He denies a history of chest pain, chills, dizziness, fatigue, fevers, myalgias, nausea, shortness of breath, sweats, vomiting, weakness, weight loss, and wheezing and denies a history of asthma. Patient admits to and does not smoke cigarettes.     OBJECTIVE:  He appears well, vital signs are as noted. Ears normal.  Throat and pharynx normal.  Neck supple. No adenopathy in the neck. Nose is congested. Sinuses non tender. The chest is clear, without wheezes or rales.    ASSESSMENT:   viral upper respiratory illness    PLAN:  Symptomatic therapy suggested: push fluids, rest, and return office visit prn if symptoms persist or worsen. Lack of antibiotic effectiveness discussed with him. Call or return to clinic prn if these symptoms worsen or fail to improve as anticipated.         Assessment/Plan   Problem List Items Addressed This Visit    None  Visit Diagnoses       URI with cough and congestion    -  Primary                 This visit was completed via [employer]. All issues as below were discussed and addressed but no physical exam was performed. If it was felt that the patient should be evaluated in clinic then they were directed there. The patient verbally consented to visit. Spent a total of 20 minutes with patient on Tele-Health discussing health concerns & more than 50% of this time was spent in counseling & coordination of care.

## 2025-06-01 DIAGNOSIS — E78.2 MIXED HYPERLIPIDEMIA: ICD-10-CM

## 2025-06-03 RX ORDER — ATORVASTATIN CALCIUM 40 MG/1
40 TABLET, FILM COATED ORAL DAILY
Qty: 90 TABLET | Refills: 1 | Status: SHIPPED | OUTPATIENT
Start: 2025-06-03

## 2025-07-02 ENCOUNTER — APPOINTMENT (OUTPATIENT)
Dept: ENDOCRINOLOGY | Facility: CLINIC | Age: 55
End: 2025-07-02
Payer: COMMERCIAL

## 2025-07-02 VITALS
WEIGHT: 305 LBS | HEIGHT: 72 IN | BODY MASS INDEX: 41.31 KG/M2 | DIASTOLIC BLOOD PRESSURE: 78 MMHG | SYSTOLIC BLOOD PRESSURE: 136 MMHG

## 2025-07-02 DIAGNOSIS — G47.33 OBSTRUCTIVE SLEEP APNEA SYNDROME: ICD-10-CM

## 2025-07-02 DIAGNOSIS — E78.2 MIXED HYPERLIPIDEMIA: ICD-10-CM

## 2025-07-02 DIAGNOSIS — I10 BENIGN ESSENTIAL HYPERTENSION: ICD-10-CM

## 2025-07-02 DIAGNOSIS — E11.9 TYPE 2 DIABETES MELLITUS WITHOUT COMPLICATION, WITHOUT LONG-TERM CURRENT USE OF INSULIN: Primary | ICD-10-CM

## 2025-07-02 PROCEDURE — 3075F SYST BP GE 130 - 139MM HG: CPT | Performed by: INTERNAL MEDICINE

## 2025-07-02 PROCEDURE — 3008F BODY MASS INDEX DOCD: CPT | Performed by: INTERNAL MEDICINE

## 2025-07-02 PROCEDURE — 99205 OFFICE O/P NEW HI 60 MIN: CPT | Performed by: INTERNAL MEDICINE

## 2025-07-02 PROCEDURE — 3078F DIAST BP <80 MM HG: CPT | Performed by: INTERNAL MEDICINE

## 2025-07-02 RX ORDER — TIRZEPATIDE 5 MG/.5ML
5 INJECTION, SOLUTION SUBCUTANEOUS
Qty: 2 ML | Refills: 0 | Status: SHIPPED | OUTPATIENT
Start: 2025-07-02

## 2025-07-02 NOTE — PROGRESS NOTES
Patient ID: Jack Workman is a 55 y.o. male who presents for New Patient Visit (Referred by Anitra Shin for DM).  HPI  The patient was referred for evaluation of type 2 diabetes uncontrolled.    This is a 55-year-old gentleman whose had diabetes for approximately last 10 years.    It is complicated by neuropathy microalbuminuria no retinopathy.    He states his sugars were under better control until 2 years ago when he quit smoking and started eating a lot of candy.    He has tighten things back up since February although went on vacation in the last 2 weeks was less stretched.    He continues on metformin 1000 mg twice daily glimepiride 4 mg every morning and nightly and Trulicity 3 mg daily.    He had tried Ozempic when Trulicity was unavailable and did not tolerate even the 0.25 mg dose.    He is not checking blood sugars.    He has no symptoms of hypoglycemia.    He has a past history of hypertension hyperlipidemia suspected sleep apnea.    Socially he is  not currently working ex-smoker drinks alcohol twice weekly.    Family history positive for diabetes in his father negative for thyroid.    ROS  Comprehensive review of systems is negative.    Objective   Physical Exam  Visit Vitals  /78      Vitals:    07/02/25 0859   Weight: 138 kg (305 lb)      Body mass index is 41.37 kg/m².      Alert and oriented x3  In no distress  No focal neurologic deficits  No supraclavicular or dorsal fat  No purple striae  Integument intact    ENT normal. No adenopathy  Fundi normal  Slight disconjugate gaze  Thyroid palpable and normal. No nodules  Chest clear to auscultation  Heart sounds are normal  Abdomen nontender. Bowel sounds normal. No organomegaly  Feet are okay  Decreased vibration and monofilament sensation normal pulses, no lesions    Current Medications[1]    Assessment/Plan     1.  Type 2 diabetes uncontrolled  2.  Hypertension  3.  Hyperlipidemia  4.  Suspected sleep apnea    We discussed  the pathophysiology of diabetes, insulin resistance and insulin insufficiency. We discussed risks for complications, goals for treatment, as well as options for treatment.    He will work on his carbohydrate intake.    He will continue the metformin and glimepiride.    Will try to switch the Trulicity to Mounjaro starting at 5 mg and working up from there.    If insurance will not cover it we will increase the Trulicity to the 4.5 mg dose.    When he is under better control we will look at adding in an SGLT2 inhibitor.    Will check hemoglobin A1c TSH urine microalbumin today.    He will follow-up with me in 4 months sooner as needed.    I spent 60 minutes with this patient.  Greater than 50% of this time was spent in counseling and/or coordination of care.             [1]   Current Outpatient Medications   Medication Sig Dispense Refill    albuterol 90 mcg/actuation inhaler Inhale 2 puffs every 6 hours if needed for wheezing. 18 g 6    atorvastatin (Lipitor) 40 mg tablet TAKE 1 TABLET BY MOUTH EVERY DAY 90 tablet 1    dulaglutide 3 mg/0.5 mL pen injector Inject 3 mg under the skin 1 (one) time per week. 2 mL 11    fluticasone (Flonase) 50 mcg/actuation nasal spray Use two sprays in each nostril once daily. 48 mL 1    glimepiride (Amaryl) 4 mg tablet Take 1 tablet (4 mg) by mouth 2 times a day. 180 tablet 1    metFORMIN (Glucophage) 1,000 mg tablet Take 1 tablet (1,000 mg) by mouth 2 times a day. Take with food. 180 tablet 3    metoprolol succinate XL (Toprol-XL) 100 mg 24 hr tablet Take 1 tablet (100 mg) by mouth once daily. 90 tablet 1    OneTouch Ultra Test strip TEST 3 TIMES DAILY.. DX: E11.65, PT NON- INSULIN DEPENDENT 300 strip 3    valsartan-hydrochlorothiazide (Diovan-HCT) 160-12.5 mg tablet Take 1 tablet by mouth once daily. 90 tablet 1     No current facility-administered medications for this visit.

## 2025-07-03 LAB
ALBUMIN/CREAT UR: 36 MG/G CREAT
CREAT UR-MCNC: 132 MG/DL (ref 20–320)
EST. AVERAGE GLUCOSE BLD GHB EST-MCNC: 177 MG/DL
EST. AVERAGE GLUCOSE BLD GHB EST-SCNC: 9.8 MMOL/L
HBA1C MFR BLD: 7.8 %
MICROALBUMIN UR-MCNC: 4.8 MG/DL
TSH SERPL-ACNC: 2.28 MIU/L (ref 0.4–4.5)

## 2025-07-11 DIAGNOSIS — E11.9 TYPE 2 DIABETES MELLITUS WITHOUT COMPLICATION, WITHOUT LONG-TERM CURRENT USE OF INSULIN: Primary | ICD-10-CM

## 2025-07-14 RX ORDER — DULAGLUTIDE 4.5 MG/.5ML
4.5 INJECTION, SOLUTION SUBCUTANEOUS WEEKLY
Qty: 2 ML | Refills: 0 | Status: SHIPPED | OUTPATIENT
Start: 2025-07-14

## 2025-08-01 DIAGNOSIS — I10 PRIMARY HYPERTENSION: ICD-10-CM

## 2025-08-04 RX ORDER — METOPROLOL SUCCINATE 100 MG/1
100 TABLET, EXTENDED RELEASE ORAL DAILY
Qty: 90 TABLET | Refills: 1 | Status: SHIPPED | OUTPATIENT
Start: 2025-08-04

## 2025-08-13 DIAGNOSIS — H93.13 RINGING IN EARS, BILATERAL: ICD-10-CM

## 2025-08-13 DIAGNOSIS — E11.9 DIABETES MELLITUS, STABLE (MULTI): ICD-10-CM

## 2025-08-13 DIAGNOSIS — E11.9 TYPE 2 DIABETES MELLITUS WITHOUT COMPLICATION, WITHOUT LONG-TERM CURRENT USE OF INSULIN: ICD-10-CM

## 2025-08-13 RX ORDER — DULAGLUTIDE 4.5 MG/.5ML
4.5 INJECTION, SOLUTION SUBCUTANEOUS
Qty: 4 PEN | Refills: 3 | Status: SHIPPED | OUTPATIENT
Start: 2025-08-17

## 2025-08-14 RX ORDER — FLUTICASONE PROPIONATE 50 MCG
2 SPRAY, SUSPENSION (ML) NASAL DAILY
Qty: 48 ML | Refills: 1 | Status: SHIPPED | OUTPATIENT
Start: 2025-08-14

## 2025-08-14 RX ORDER — GLIMEPIRIDE 4 MG/1
4 TABLET ORAL 2 TIMES DAILY
Qty: 180 TABLET | Refills: 1 | Status: SHIPPED | OUTPATIENT
Start: 2025-08-14

## 2025-11-26 ENCOUNTER — APPOINTMENT (OUTPATIENT)
Dept: ENDOCRINOLOGY | Facility: CLINIC | Age: 55
End: 2025-11-26
Payer: COMMERCIAL